# Patient Record
Sex: FEMALE | Race: WHITE | Employment: UNEMPLOYED | URBAN - METROPOLITAN AREA
[De-identification: names, ages, dates, MRNs, and addresses within clinical notes are randomized per-mention and may not be internally consistent; named-entity substitution may affect disease eponyms.]

---

## 2020-08-20 ENCOUNTER — HOSPITAL ENCOUNTER (INPATIENT)
Facility: CLINIC | Age: 50
LOS: 3 days | Discharge: HOME | End: 2020-08-23
Attending: PHYSICIAN ASSISTANT | Admitting: INTERNAL MEDICINE
Payer: COMMERCIAL

## 2020-08-20 ENCOUNTER — APPOINTMENT (OUTPATIENT)
Dept: CT IMAGING | Facility: CLINIC | Age: 50
End: 2020-08-20
Attending: PHYSICIAN ASSISTANT
Payer: COMMERCIAL

## 2020-08-20 DIAGNOSIS — K57.32 DIVERTICULITIS OF COLON: ICD-10-CM

## 2020-08-20 DIAGNOSIS — K57.20 DIVERTICULITIS OF LARGE INTESTINE WITH PERFORATION WITHOUT BLEEDING: Primary | ICD-10-CM

## 2020-08-20 DIAGNOSIS — K63.1: ICD-10-CM

## 2020-08-20 LAB
ALBUMIN UR-MCNC: 50 MG/DL
ANION GAP SERPL CALCULATED.3IONS-SCNC: 4 MMOL/L (ref 3–14)
APPEARANCE UR: ABNORMAL
BACTERIA #/AREA URNS HPF: ABNORMAL /HPF
BASOPHILS # BLD AUTO: 0.1 10E9/L (ref 0–0.2)
BASOPHILS NFR BLD AUTO: 0.4 %
BILIRUB UR QL STRIP: ABNORMAL
BUN SERPL-MCNC: 13 MG/DL (ref 7–30)
CALCIUM SERPL-MCNC: 9 MG/DL (ref 8.5–10.1)
CHLORIDE SERPL-SCNC: 103 MMOL/L (ref 94–109)
CO2 SERPL-SCNC: 27 MMOL/L (ref 20–32)
COLOR UR AUTO: ABNORMAL
CREAT SERPL-MCNC: 0.92 MG/DL (ref 0.52–1.04)
DIFFERENTIAL METHOD BLD: ABNORMAL
EOSINOPHIL # BLD AUTO: 0 10E9/L (ref 0–0.7)
EOSINOPHIL NFR BLD AUTO: 0.2 %
ERYTHROCYTE [DISTWIDTH] IN BLOOD BY AUTOMATED COUNT: 14.6 % (ref 10–15)
GFR SERPL CREATININE-BSD FRML MDRD: 74 ML/MIN/{1.73_M2}
GLUCOSE SERPL-MCNC: 108 MG/DL (ref 70–99)
GLUCOSE UR STRIP-MCNC: NEGATIVE MG/DL
HCT VFR BLD AUTO: 53.9 % (ref 35–47)
HGB BLD-MCNC: 16.7 G/DL (ref 11.7–15.7)
HGB UR QL STRIP: ABNORMAL
IMM GRANULOCYTES # BLD: 0.1 10E9/L (ref 0–0.4)
IMM GRANULOCYTES NFR BLD: 0.4 %
KETONES UR STRIP-MCNC: ABNORMAL MG/DL
LACTATE BLD-SCNC: 0.5 MMOL/L (ref 0.7–2)
LEUKOCYTE ESTERASE UR QL STRIP: ABNORMAL
LYMPHOCYTES # BLD AUTO: 2 10E9/L (ref 0.8–5.3)
LYMPHOCYTES NFR BLD AUTO: 11.9 %
MCH RBC QN AUTO: 30 PG (ref 26.5–33)
MCHC RBC AUTO-ENTMCNC: 31 G/DL (ref 31.5–36.5)
MCV RBC AUTO: 97 FL (ref 78–100)
MONOCYTES # BLD AUTO: 1.2 10E9/L (ref 0–1.3)
MONOCYTES NFR BLD AUTO: 7.1 %
MUCOUS THREADS #/AREA URNS LPF: PRESENT /LPF
NEUTROPHILS # BLD AUTO: 13.6 10E9/L (ref 1.6–8.3)
NEUTROPHILS NFR BLD AUTO: 80 %
NITRATE UR QL: NEGATIVE
NRBC # BLD AUTO: 0 10*3/UL
NRBC BLD AUTO-RTO: 0 /100
PH UR STRIP: 5.5 PH (ref 5–7)
PLATELET # BLD AUTO: 192 10E9/L (ref 150–450)
POTASSIUM SERPL-SCNC: 4.4 MMOL/L (ref 3.4–5.3)
RBC # BLD AUTO: 5.56 10E12/L (ref 3.8–5.2)
RBC #/AREA URNS AUTO: 5 /HPF (ref 0–2)
SODIUM SERPL-SCNC: 134 MMOL/L (ref 133–144)
SOURCE: ABNORMAL
SP GR UR STRIP: 1.03 (ref 1–1.03)
SQUAMOUS #/AREA URNS AUTO: 22 /HPF (ref 0–1)
UROBILINOGEN UR STRIP-MCNC: 3 MG/DL (ref 0–2)
WBC # BLD AUTO: 17 10E9/L (ref 4–11)
WBC #/AREA URNS AUTO: 21 /HPF (ref 0–5)

## 2020-08-20 PROCEDURE — 25800030 ZZH RX IP 258 OP 636: Performed by: PHYSICIAN ASSISTANT

## 2020-08-20 PROCEDURE — 25000128 H RX IP 250 OP 636: Performed by: PHYSICIAN ASSISTANT

## 2020-08-20 PROCEDURE — 83605 ASSAY OF LACTIC ACID: CPT | Performed by: PHYSICIAN ASSISTANT

## 2020-08-20 PROCEDURE — 25000125 ZZHC RX 250: Performed by: PHYSICIAN ASSISTANT

## 2020-08-20 PROCEDURE — 99285 EMERGENCY DEPT VISIT HI MDM: CPT | Mod: 25

## 2020-08-20 PROCEDURE — 80048 BASIC METABOLIC PNL TOTAL CA: CPT | Performed by: PHYSICIAN ASSISTANT

## 2020-08-20 PROCEDURE — 99207 ZZC APP CREDIT; MD BILLING SHARED VISIT: CPT | Performed by: PHYSICIAN ASSISTANT

## 2020-08-20 PROCEDURE — 85025 COMPLETE CBC W/AUTO DIFF WBC: CPT | Performed by: PHYSICIAN ASSISTANT

## 2020-08-20 PROCEDURE — 12000000 ZZH R&B MED SURG/OB

## 2020-08-20 PROCEDURE — 87040 BLOOD CULTURE FOR BACTERIA: CPT | Performed by: PHYSICIAN ASSISTANT

## 2020-08-20 PROCEDURE — U0003 INFECTIOUS AGENT DETECTION BY NUCLEIC ACID (DNA OR RNA); SEVERE ACUTE RESPIRATORY SYNDROME CORONAVIRUS 2 (SARS-COV-2) (CORONAVIRUS DISEASE [COVID-19]), AMPLIFIED PROBE TECHNIQUE, MAKING USE OF HIGH THROUGHPUT TECHNOLOGIES AS DESCRIBED BY CMS-2020-01-R: HCPCS | Performed by: PHYSICIAN ASSISTANT

## 2020-08-20 PROCEDURE — 99223 1ST HOSP IP/OBS HIGH 75: CPT | Mod: AI | Performed by: INTERNAL MEDICINE

## 2020-08-20 PROCEDURE — 25000132 ZZH RX MED GY IP 250 OP 250 PS 637: Performed by: PHYSICIAN ASSISTANT

## 2020-08-20 PROCEDURE — 96361 HYDRATE IV INFUSION ADD-ON: CPT

## 2020-08-20 PROCEDURE — C9803 HOPD COVID-19 SPEC COLLECT: HCPCS

## 2020-08-20 PROCEDURE — 99222 1ST HOSP IP/OBS MODERATE 55: CPT | Performed by: SURGERY

## 2020-08-20 PROCEDURE — 96365 THER/PROPH/DIAG IV INF INIT: CPT | Mod: 59

## 2020-08-20 PROCEDURE — 96375 TX/PRO/DX INJ NEW DRUG ADDON: CPT

## 2020-08-20 PROCEDURE — 87086 URINE CULTURE/COLONY COUNT: CPT | Performed by: PHYSICIAN ASSISTANT

## 2020-08-20 PROCEDURE — 81001 URINALYSIS AUTO W/SCOPE: CPT | Performed by: PHYSICIAN ASSISTANT

## 2020-08-20 PROCEDURE — 74177 CT ABD & PELVIS W/CONTRAST: CPT

## 2020-08-20 RX ORDER — VARENICLINE TARTRATE 1 MG/1
1 TABLET, FILM COATED ORAL 2 TIMES DAILY
COMMUNITY

## 2020-08-20 RX ORDER — ACETAMINOPHEN 325 MG/1
650 TABLET ORAL EVERY 4 HOURS PRN
Status: DISCONTINUED | OUTPATIENT
Start: 2020-08-20 | End: 2020-08-23 | Stop reason: HOSPADM

## 2020-08-20 RX ORDER — ONDANSETRON 4 MG/1
4 TABLET, ORALLY DISINTEGRATING ORAL EVERY 6 HOURS PRN
Status: DISCONTINUED | OUTPATIENT
Start: 2020-08-20 | End: 2020-08-23 | Stop reason: HOSPADM

## 2020-08-20 RX ORDER — SODIUM CHLORIDE 9 MG/ML
INJECTION, SOLUTION INTRAVENOUS CONTINUOUS
Status: DISCONTINUED | OUTPATIENT
Start: 2020-08-20 | End: 2020-08-21

## 2020-08-20 RX ORDER — KETOROLAC TROMETHAMINE 15 MG/ML
15 INJECTION, SOLUTION INTRAMUSCULAR; INTRAVENOUS ONCE
Status: COMPLETED | OUTPATIENT
Start: 2020-08-20 | End: 2020-08-20

## 2020-08-20 RX ORDER — IBUPROFEN 600 MG/1
600 TABLET, FILM COATED ORAL EVERY 6 HOURS PRN
Status: DISCONTINUED | OUTPATIENT
Start: 2020-08-20 | End: 2020-08-23 | Stop reason: HOSPADM

## 2020-08-20 RX ORDER — SODIUM CHLORIDE 9 MG/ML
INJECTION, SOLUTION INTRAVENOUS CONTINUOUS
Status: DISCONTINUED | OUTPATIENT
Start: 2020-08-20 | End: 2020-08-20

## 2020-08-20 RX ORDER — LEVOTHYROXINE SODIUM 125 UG/1
125 TABLET ORAL DAILY
Status: DISCONTINUED | OUTPATIENT
Start: 2020-08-21 | End: 2020-08-23 | Stop reason: HOSPADM

## 2020-08-20 RX ORDER — OXYCODONE HYDROCHLORIDE 5 MG/1
5-10 TABLET ORAL
Status: DISCONTINUED | OUTPATIENT
Start: 2020-08-20 | End: 2020-08-23 | Stop reason: HOSPADM

## 2020-08-20 RX ORDER — ONDANSETRON 4 MG/1
4 TABLET, FILM COATED ORAL 3 TIMES DAILY PRN
COMMUNITY
Start: 2020-07-16

## 2020-08-20 RX ORDER — HYDROMORPHONE HYDROCHLORIDE 1 MG/ML
.3-.5 INJECTION, SOLUTION INTRAMUSCULAR; INTRAVENOUS; SUBCUTANEOUS
Status: DISCONTINUED | OUTPATIENT
Start: 2020-08-20 | End: 2020-08-23 | Stop reason: HOSPADM

## 2020-08-20 RX ORDER — LEVOTHYROXINE SODIUM 125 UG/1
125 TABLET ORAL DAILY
COMMUNITY
Start: 2020-07-16

## 2020-08-20 RX ORDER — BUDESONIDE 0.5 MG/2ML
0.5 INHALANT ORAL 2 TIMES DAILY PRN
COMMUNITY
Start: 2019-10-21

## 2020-08-20 RX ORDER — ALBUTEROL SULFATE 90 UG/1
2 AEROSOL, METERED RESPIRATORY (INHALATION) EVERY 4 HOURS PRN
COMMUNITY
Start: 2020-03-20

## 2020-08-20 RX ORDER — NICOTINE 21 MG/24HR
1 PATCH, TRANSDERMAL 24 HOURS TRANSDERMAL DAILY PRN
Status: DISCONTINUED | OUTPATIENT
Start: 2020-08-20 | End: 2020-08-23 | Stop reason: HOSPADM

## 2020-08-20 RX ORDER — METOCLOPRAMIDE HYDROCHLORIDE 5 MG/ML
5 INJECTION INTRAMUSCULAR; INTRAVENOUS ONCE
Status: COMPLETED | OUTPATIENT
Start: 2020-08-20 | End: 2020-08-20

## 2020-08-20 RX ORDER — TRAZODONE HYDROCHLORIDE 150 MG/1
150 TABLET ORAL
COMMUNITY
Start: 2020-03-27

## 2020-08-20 RX ORDER — LIDOCAINE 40 MG/G
CREAM TOPICAL
Status: DISCONTINUED | OUTPATIENT
Start: 2020-08-20 | End: 2020-08-23 | Stop reason: HOSPADM

## 2020-08-20 RX ORDER — NALOXONE HYDROCHLORIDE 0.4 MG/ML
.1-.4 INJECTION, SOLUTION INTRAMUSCULAR; INTRAVENOUS; SUBCUTANEOUS
Status: DISCONTINUED | OUTPATIENT
Start: 2020-08-20 | End: 2020-08-23 | Stop reason: HOSPADM

## 2020-08-20 RX ORDER — ONDANSETRON 2 MG/ML
4 INJECTION INTRAMUSCULAR; INTRAVENOUS EVERY 6 HOURS PRN
Status: DISCONTINUED | OUTPATIENT
Start: 2020-08-20 | End: 2020-08-23 | Stop reason: HOSPADM

## 2020-08-20 RX ORDER — IOPAMIDOL 755 MG/ML
500 INJECTION, SOLUTION INTRAVASCULAR ONCE
Status: COMPLETED | OUTPATIENT
Start: 2020-08-20 | End: 2020-08-20

## 2020-08-20 RX ADMIN — OXYCODONE HYDROCHLORIDE 5 MG: 5 TABLET ORAL at 17:46

## 2020-08-20 RX ADMIN — METOCLOPRAMIDE HYDROCHLORIDE 5 MG: 5 INJECTION INTRAMUSCULAR; INTRAVENOUS at 11:14

## 2020-08-20 RX ADMIN — SODIUM CHLORIDE: 9 INJECTION, SOLUTION INTRAVENOUS at 16:16

## 2020-08-20 RX ADMIN — TAZOBACTAM SODIUM AND PIPERACILLIN SODIUM 3.38 G: 375; 3 INJECTION, SOLUTION INTRAVENOUS at 13:40

## 2020-08-20 RX ADMIN — TAZOBACTAM SODIUM AND PIPERACILLIN SODIUM 4.5 G: 500; 4 INJECTION, SOLUTION INTRAVENOUS at 20:52

## 2020-08-20 RX ADMIN — SODIUM CHLORIDE 65 ML: 9 INJECTION, SOLUTION INTRAVENOUS at 12:43

## 2020-08-20 RX ADMIN — IOPAMIDOL 100 ML: 755 INJECTION, SOLUTION INTRAVENOUS at 12:42

## 2020-08-20 RX ADMIN — ACETAMINOPHEN 650 MG: 325 TABLET, FILM COATED ORAL at 23:49

## 2020-08-20 RX ADMIN — SODIUM CHLORIDE 1000 ML: 9 INJECTION, SOLUTION INTRAVENOUS at 13:41

## 2020-08-20 RX ADMIN — OXYCODONE HYDROCHLORIDE 10 MG: 5 TABLET ORAL at 23:49

## 2020-08-20 RX ADMIN — SODIUM CHLORIDE 1000 ML: 9 INJECTION, SOLUTION INTRAVENOUS at 11:17

## 2020-08-20 RX ADMIN — KETOROLAC TROMETHAMINE 15 MG: 15 INJECTION, SOLUTION INTRAMUSCULAR; INTRAVENOUS at 11:14

## 2020-08-20 ASSESSMENT — ACTIVITIES OF DAILY LIVING (ADL)
ADLS_ACUITY_SCORE: 14
ADLS_ACUITY_SCORE: 12

## 2020-08-20 ASSESSMENT — ENCOUNTER SYMPTOMS
DYSURIA: 0
CHEST TIGHTNESS: 1
FREQUENCY: 0
FLANK PAIN: 0
COUGH: 1
VOMITING: 0
ROS GI COMMENTS: RECTAL DISCHARGE
ABDOMINAL PAIN: 1
SHORTNESS OF BREATH: 1
FEVER: 0
DIARRHEA: 0

## 2020-08-20 NOTE — ED NOTES
Assumed care of patient, BP cuff and monitoring devices applied. Pt educated on need to transfer to our respiratory symptom unit due to dropping oxygen saturations. Pt verbalized understanding but reports that this is normal for her and her COPD dx. Pt in no apparent distress. Call light in reach, side rails up x2.

## 2020-08-20 NOTE — H&P
History and Physical     Josee Fairbanks MRN# [id number]   YOB: 1970 Age: 49 year old      Date of Admission:  8/20/2020    Primary care provider: Garret Rivera          Assessment and Plan:   Josee Fairbanks is a 49 year old female with a PMH significant for alcohol abuse, now in remission, asthma, GERD and hypothyroidism, who presents with lower abdominal pain and nausea.     1. Acute sigmoid diverticulitis with perforation - 4 days of abdominal pain with nausea, initially thought it was constipation so took Mag citrate with subsequent diarrhea, some blood. Denies hx of diverticulitis, denies fevers, notes nausea, denies vomiting. Similar pain about 6 months ago but related this to alcohol use, quit drinking then and went through withdrawal. No urgent need for surgery but will consult gen surg. Continue IVFs, pain control and supportive cares. Will continue IV Zosyn and keep NPO today to rest the bowel.   2. GERD - resume PPI but will make it IV for now  3. Asthma - resume home inhalers if available  4. Hypothyroidism - resume home meds  5. Hx of alcohol abuse - stopped drinking ~6 months ago, was hospitalized and went through withdrawal. No evidence of withdrawal here    Prophylaxis - mechanical  Code status - Full Code  Dispo - admit to inpatient. Anticipate at least 2 nights in the hospital                Chief Complaint:   Abdominal pain         History of Present Illness:   Josee Fairbanks is a 49 year old female with a PMH significant for alcohol abuse, now in remission, asthma, GERD and hypothyroidism, who presents with lower abdominal pain and nausea.  Patient reports 4 days of lower abdominal pain with associated nausea.  She initially thought that she was constipated so took mag citrate and subsequently developed diarrhea.  She does report some bloatedness stool.  She endorses feeling hot and cold related to her pain but denies having measured fevers.  She also denies  chest pain, shortness of breath, cough, vomiting or dysuria.  She denies a history of diverticulitis but has had a colonoscopy.  Patient reports similar pain about 6 months ago but this was in the setting of heavy alcohol use and was admitted to the hospital for alcohol withdrawal at that time. She is a smoker, ~1 pack per day.   In the ED, vital signs were initially stable.  She did develop mild hypoxia, SPO2 88% on room air, this improved to 96% on 2 L via nasal cannula.  BMP is unremarkable.  CBC is significant for a elevated WBC at 17, hemoglobin 16.7, platelets are normal at 192.  Lactic acid is within normal limits at 0.5.  UA is abnormal with large leukocyte esterase and 21 white blood cells and trace blood.  Blood cultures are drawn and pending.  CT of the abdomen pelvis was obtained which shows a long segment of diverticulitis in the sigmoid colon with localized contained perforation and no evidence of abscess.  She was given 1 L normal saline bolus x2, 50 mg IV Toradol, 5 mg IV Reglan, and started on IV Zosyn.  She will be admitted for further management of acute diverticulitis with perforation.  General surgery was made aware of the patient in the ED, no indication for emergent surgical intervention at this time.    Hx obtained by speaking with ED physician, chart review and pt interview.               Past Medical History:   Alcohol abuse, in remission  GERD  Asthma  Hypothyroidism          Past Surgical History:   Cholecystectomy          Social History:     Social History     Socioeconomic History     Marital status:      Spouse name: Not on file     Number of children: Not on file     Years of education: Not on file     Highest education level: Not on file   Occupational History     Not on file   Social Needs     Financial resource strain: Not on file     Food insecurity     Worry: Not on file     Inability: Not on file     Transportation needs     Medical: Not on file     Non-medical: Not on  file   Tobacco Use     Smoking status: Not on file   Substance and Sexual Activity     Alcohol use: Not on file     Drug use: Not on file     Sexual activity: Not on file   Lifestyle     Physical activity     Days per week: Not on file     Minutes per session: Not on file     Stress: Not on file   Relationships     Social connections     Talks on phone: Not on file     Gets together: Not on file     Attends Baptism service: Not on file     Active member of club or organization: Not on file     Attends meetings of clubs or organizations: Not on file     Relationship status: Not on file     Intimate partner violence     Fear of current or ex partner: Not on file     Emotionally abused: Not on file     Physically abused: Not on file     Forced sexual activity: Not on file   Other Topics Concern     Not on file   Social History Narrative     Not on file   Current smoker, 1 pack/day.  History of alcohol abuse, currently in remission.            Family History:   Reviewed and noncontributory         Allergies:      Allergies   Allergen Reactions     Prednisone Other (See Comments)     Shaky, insomnia, urinary frequency, stomach upset     Topiramate Other (See Comments)     forgetful               Medications:     Prior to Admission medications    Medication Sig Last Dose Taking? Auth Provider   budesonide (PULMICORT) 0.5 MG/2ML neb solution Take 0.5 mg by nebulization 2 times daily  Yes Unknown, Entered By History   Fluticasone-Umeclidin-Vilanterol (TRELEGY ELLIPTA) 100-62.5-25 MCG/INH oral inhaler Inhale 1 puff into the lungs daily  Yes Unknown, Entered By History   levothyroxine (SYNTHROID/LEVOTHROID) 125 MCG tablet Take 125 mcg by mouth daily  Yes Unknown, Entered By History   ondansetron (ZOFRAN) 4 MG tablet Take 4 mg by mouth 3 times daily as needed  Yes Unknown, Entered By History   PROAIR  (90 Base) MCG/ACT inhaler Inhale 2 puffs into the lungs every 4 hours as needed for shortness of breath / dyspnea  Yes  Unknown, Entered By History   sertraline (ZOLOFT) 50 MG tablet Take 50 mg by mouth daily  Yes Unknown, Entered By History   traZODone (DESYREL) 150 MG tablet Take 150 mg by mouth nightly as needed  Yes Unknown, Entered By History   omeprazole (PRILOSEC) 20 MG DR capsule Take 20 mg by mouth daily as needed   Unknown, Entered By History              Review of Systems:   A Comprehensive greater than 10 system review of systems was carried out.  Pertinent positives and negatives are noted above.  Otherwise negative for contributory information.     Review Of Systems  Skin: negative  Eyes: negative  Ears/Nose/Throat: negative  Respiratory: No shortness of breath, dyspnea on exertion, cough, or hemoptysis  Cardiovascular: negative  Gastrointestinal: negative  Genitourinary: negative  Musculoskeletal: negative  Neurologic: negative  Psychiatric: negative  Hematologic/Lymphatic/Immunologic: negative  Endocrine: negative             Physical Exam:   Blood pressure 118/80, pulse 79, temperature 96.6  F (35.9  C), resp. rate 18, SpO2 97 %.  Exam:  GENERAL:  Comfortable.  PSYCH: pleasant, oriented, No acute distress.  HEENT:  Atraumatic, normocephalic. Normal conjunctiva, normal hearing, and oropharynx is normal.  NECK:  Supple, no neck vein distention  HEART:  Normal S1, S2 with no murmur, no pericardial rub, gallops or S3 or S4.  LUNGS:  Clear to auscultation, normal Respiratory effort. No wheezing, rales or ronchi.  GI:  Soft, normal bowel sounds. Lower abdominal tenderness, non distended.   EXTREMITIES:  No pedal edema, +2 pulses bilateral and equal.  SKIN:  Dry to touch, No rash, wound or ulcerations.  NEUROLOGIC:  CN 2-12 intact, BL 5/5 symmetric upper and lower extremity strength, sensation is intact with no focal deficits.               Data:     Recent Labs   Lab 08/20/20  1056   WBC 17.0*   HGB 16.7*   HCT 53.9*   MCV 97        Recent Labs   Lab 08/20/20  1056      POTASSIUM 4.4   CHLORIDE 103   CO2 27    ANIONGAP 4   *   BUN 13   CR 0.92   GFRESTIMATED 74   GFRESTBLACK 85   JACKIE 9.0     Lactic acid - 0.5    Recent Labs   Lab 08/20/20  1117   COLOR [place]   APPEARANCE Slightly Cloudy   URINEGLC Negative   URINEBILI Small*   URINEKETONE Trace*   SG 1.034   UBLD Trace*   URINEPH 5.5   PROTEIN 50*   NITRITE Negative   LEUKEST Large*   RBCU 5*   WBCU 21*       Recent Results (from the past 48 hour(s))   CT Abdomen Pelvis w Contrast    Narrative    CT ABDOMEN AND PELVIS WITH CONTRAST 8/20/2020 12:53 PM    HISTORY: Abdominal pain and bloody stool. Suspect diverticulitis.    TECHNIQUE: Helical axial scans from dome of liver through pubic  symphysis with 100 mL Isovue-370 IV contrast. Radiation dose for this  scan was reduced using automated exposure control, adjustment of the  mA and/or kV according to patient size, or iterative reconstruction  technique.    COMPARISON: None.    FINDINGS: Scattered platelike atelectasis or linear scarring in the  posterior lung bases bilaterally. Status post cholecystectomy. The  liver, spleen, pancreas, bilateral adrenal glands and right kidney are  unremarkable. There is a 1 cm benign cyst in the mid posterior left  renal cortex, and the left kidney is otherwise normal. Minimal  vascular calcifications. The bowel and mesentery in the upper abdomen  appear normal.    Scans through the pelvis show extensive thickening of the wall and  surrounding inflammatory induration related to a 9 cm length of  sigmoid colon. There is inflammatory thickening of the sigmoid  mesentery. Scattered diverticula are seen, and the most likely  diagnosis is diverticulitis. There is no evidence for abscess, but  there is a small amount of extraluminal gas in the mesentery just  superior to the sigmoid colon consistent with perforation (images 71  through 76 of series 5 and images 65 through 73 of series 4). There is  no free fluid. The appendix is identified, and there is no evidence  for appendicitis,  although a 5 mm appendicolith could be present in  the proximal appendix (image 62 of series 2). A few surgical clips are  seen in the left pelvis adjacent to the external iliac vessels (image  68 of series 2). Prior hysterectomy.      Impression    IMPRESSION:  1. Long segment of diverticulitis in the sigmoid colon with localized  contained perforation but no abscess.  2. Possible appendicolith.  3. Small benign cyst left kidney. Minimal vascular calcifications.         Iris Stratton PA-C    This patient was seen and discussed with Dr. Roper who agrees with the current plans as outlined above.

## 2020-08-20 NOTE — ED PROVIDER NOTES
History     Chief Complaint:  Abdominal Pain     HPI:  Josee Fairbanks is a 49 year old female with a history of GERD, chronic gastritis, and alcoholism who presents with abdominal pain. Patient has had constant, 6-10/10, aching and shooting low abdominal pain for the last 4 days. She also reports mucous rectal discharge when trying to pass BMs, although she has not been able to pass a BM for several days. Patient does mention some bloody discharge yesterday, although this has resolved as of today. She states that she has tried citrate and magnesium to relieve her symptoms. She denies any vomiting, diarrhea, fever, or new urinary symptoms besides her baseline. Patient mentions that she was having similar pain about 6 months ago, but thought this was due to her drinking which she has reportedly stopped.    Allergies:  Prednisone  Topiramate     Medications:    Pulmicort inhaler  Ellipta inhaler  Zoloft  Phenergan  prilosec  Albuterol inhaler  Desyrel  Chantix  Vitamin D  Miacalcin spray  Zofran  Levothyroxiine    Past Medical History:    Alcoholism  COPD  Right lower lobe pneumonia  GERD  Chronic gastritis without bleeding  Hypothyroidism  Palpitations  MAGALYS  Hypercholesteremia  Anxiety  Insomnia   Epilepsy     Past Surgical History:    Cholecystectomy  Ligation transection fallopian tube  Upper GI endoscopy  Hysterectomy  Cystourethroscopy  Colonoscopy  Colon polyp procedure  Carpal tunnel release  Debridement, left wrist      Family History:    Lung cancer  Diabetes    Social History:  Smoking status: yes  Alcohol use: no  Patient presents alone.  PCP: Garret Rivera    Marital Status:       Review of Systems   Constitutional: Negative for fever.   Respiratory: Positive for cough (chronic), chest tightness (chronic) and shortness of breath (chronic).    Gastrointestinal: Positive for abdominal pain. Negative for diarrhea and vomiting.        Rectal discharge   Genitourinary: Negative for dysuria,  flank pain, frequency and urgency.   All other systems reviewed and are negative.      Physical Exam     Vitals:  Patient Vitals for the past 24 hrs:   BP Temp Pulse Resp SpO2   08/20/20 1300 107/71 -- 83 -- 90 %   08/20/20 1258 106/69 -- 84 -- 93 %   08/20/20 1230 126/74 -- 87 -- 95 %   08/20/20 1215 120/74 -- 84 -- 95 %   08/20/20 1200 (!) 137/91 -- 83 -- 94 %   08/20/20 1152 -- -- -- -- 94 %   08/20/20 1151 130/78 -- 88 -- (!) 87 %   08/20/20 1130 116/70 -- 93 -- 91 %   08/20/20 1125 113/72 -- -- -- --   08/20/20 0950 (!) 135/90 96.6  F (35.9  C) 104 18 91 %       Physical Exam  Vitals signs and nursing note reviewed.   Constitutional:       General: She is not in acute distress.     Appearance: She is not diaphoretic.   HENT:      Head: Atraumatic.      Mouth/Throat:      Pharynx: No oropharyngeal exudate.   Eyes:      General: No scleral icterus.     Pupils: Pupils are equal, round, and reactive to light.   Cardiovascular:      Rate and Rhythm: Normal rate and regular rhythm.   Pulmonary:      Effort: Pulmonary effort is normal. No respiratory distress.   Abdominal:      General: Bowel sounds are normal.      Palpations: Abdomen is soft.      Tenderness: There is abdominal tenderness in the suprapubic area and left lower quadrant.   Musculoskeletal:         General: No tenderness.   Skin:     General: Skin is warm.      Findings: No rash.   Neurological:      Mental Status: She is alert.     :      Emergency Department Course     Imaging:  CT Abdomen/pelvis w contrast  1. Long segment of diverticulitis in the sigmoid colon with localized  contained perforation but no abscess.  2. Possible appendicolith.  3. Small benign cyst left kidney. Minimal vascular calcifications  Per radiology.    Laboratory:  CBC: WBC 17.0 (H), HGB 16.7 (H),     BMP: Glucose 108 (H) o/w WNL (Creatinine 0.92)    Urine analysis: Bilin small (A), Ketone trace (A), Blood trace (A), Albumin 50 (A), Urobilinogen 3.0 (H), Leukocyte  esterase large (A), RBC/HPF 5 (H), WBC/HPF 21 (H), Bacteria few (A), Squamous epithelial/HPF 22 (H), Mucous present (A) o/w WNL    Lactic acid (Resulted 1336): 0.5 (L)    Urine culture: pending  Blood culture x2: pending    Interventions:  1114 Toradol, 15 mg, IV  1114 Reglan, 5 mg, IV  1117 0.9% NS bolus, 1000 mL, IV    Emergency Department Course:  1041  Past medical records, nursing notes, and vitals reviewed.    1044  I performed an exam of the patient and obtained history, as documented above.    1056 IV was inserted and blood was drawn for laboratory testing, results above.    1117 The patient provided a urine sample here in the emergency department. This was sent for laboratory testing, findings above.    1238 The patient was sent for an abdominal/pelvic CT while in the emergency department, results above.     1306 Findings and plan explained to the Patient who consents to admission.    1324 Discussed the patient with DONNELL Fontaine, for Dr. Roper who will admit the patient to a Med/Surg bed for further monitoring, evaluation, and treatment.    I personally reviewed the laboratory & imaging results with the Patient and answered all related questions prior to admission.     Impression & Plan     Medical Decision Making:  Josee Fairbanks is a 49 year old female who presents to the emergency department today for evaluation of lower abdominal pain, stool changes. Clinically she voices symptoms to raise consideration for colitis, diverticulitis, or proctitis. Her diagnostic labs and imaging ultimately demonstrate diverticulitis with local perforation. She has no peritoneal signs on examination at this time. Antibiotics, analgesia, parenteral volume resuscitation, admission for further care. Hospitalist admission with surgical consultation    Diagnosis:    ICD-10-CM    1. Diverticulitis of colon  K57.32 Lactic acid whole blood     Blood culture     Blood culture   2. Perforated intestine (H)  K63.1          Disposition:  Admitted to Dr. Roper.     Scribe Disclosure:  I, Sandra Chan, am serving as a scribe at 10:44 AM on 8/20/2020 to document services personally performed by Jose Stout based on my observations and the provider's statements to me.       Sandra Chan  8/20/2020     Jose Stout PA-C  08/20/20 3351

## 2020-08-20 NOTE — PHARMACY-ADMISSION MEDICATION HISTORY
Admission medication history interview status for this patient is complete. See [facility] admission navigator for allergy information, prior to admission medications and immunization status.     Medication history interview done via telephone during Covid-19 pandemic, indicate source(s): Patient  Medication history resources (including written lists, pill bottles, clinic record):None  Pharmacy: [facility] in [place], [place]  Changes made to PTA medication list:  Added: Chantix  Deleted: None  Changed: Budesonide BID --> BID PRN    Actions taken by pharmacist (provider contacted, etc):None     Additional medication history information:None    Medication reconciliation/reorder completed by provider prior to medication history?  Yes   (Y/N)     For patients on insulin therapy: No  (Y/N)      Prior to Admission medications    Medication Sig Last Dose Taking? Auth Provider   Fluticasone-Umeclidin-Vilanterol (TRELEGY ELLIPTA) 100-62.5-25 MCG/INH oral inhaler Inhale 1 puff into the lungs daily Past Week at Unknown time Yes Unknown, Entered By History   levothyroxine (SYNTHROID/LEVOTHROID) 125 MCG tablet Take 125 mcg by mouth daily Past Week at Unknown time Yes Unknown, Entered By History   omeprazole (PRILOSEC) 20 MG DR capsule Take 20 mg by mouth daily as needed Past Week at Unknown time Yes Unknown, Entered By History   ondansetron (ZOFRAN) 4 MG tablet Take 4 mg by mouth 3 times daily as needed Past Week at Unknown time Yes Unknown, Entered By History   PROAIR  (90 Base) MCG/ACT inhaler Inhale 2 puffs into the lungs every 4 hours as needed for shortness of breath / dyspnea 8/19/2020 at Unknown time Yes Unknown, Entered By History   sertraline (ZOLOFT) 50 MG tablet Take 50 mg by mouth daily Past Week at Unknown time Yes Unknown, Entered By History   traZODone (DESYREL) 150 MG tablet Take 150 mg by mouth nightly as needed Past Week at Unknown time Yes Unknown, Entered By History   varenicline (CHANTIX) 1 MG tablet  Take 1 mg by mouth 2 times daily Past Week at Unknown time Yes Unknown, Entered By History   budesonide (PULMICORT) 0.5 MG/2ML neb solution Take 0.5 mg by nebulization 2 times daily as needed  More than a month at Unknown time  Unknown, Entered By History

## 2020-08-20 NOTE — ED NOTES
"[facility]  ED Nurse Handoff Report    Josee Fairbanks is a 49 year old female   ED Chief complaint: Abdominal Pain  . ED Diagnosis:   Final diagnoses:   Diverticulitis of colon   Perforated intestine (H)     Allergies:   Allergies   Allergen Reactions     Prednisone Other (See Comments)     Shaky, insomnia, urinary frequency, stomach upset     Topiramate Other (See Comments)     forgetful       Code Status: Full Code  Activity level - Baseline/Home:  Independent. Activity Level - Current:   Independent. Lift room needed: No. Bariatric: No   Needed: No   Isolation: No. Infection: Not Applicable.     Vital Signs:   Vitals:    08/20/20 1300 08/20/20 1311 08/20/20 1315 08/20/20 1330   BP: 107/71 114/75 114/75 122/71   Pulse: 83  84 82   Resp:       Temp:       SpO2: 90% 91% 90% 94%       Cardiac Rhythm:  ,      Pain level:    Patient confused: No. Patient Falls Risk: Yes.   Elimination Status: Has voided   Patient Report - Initial Complaint: Patient presents with complaints of lower abdomen pain for the past four days. Denies any vomiting or diarrhea. ABC intact without need for intervention at this time. .   Focused Assessment:    Gastrointestinal Gastrointestinal - Gastrointestinal Comment: Lower abdominal pain and tenderness for 4 days, she thought she was perhaps consitated so took mag citrate. No vomiting, but states she feels \"gassy.\"    Patient is visiting from out of town. Patient has a history of COPD and wears 2.5 liters of oxygen intermittently at baseline. Pt did not bring her oxygen with her on her trip. Patient maintaining oxygen saturations on 2L nasal cannula. Room air trial was tried in ER and patient dropped oxygen saturations to 88%.     Tests Performed: labs, imaging Abnormal Results:   Labs Ordered and Resulted from Time of ED Arrival Up to the Time of Departure from the ED   CBC WITH PLATELETS DIFFERENTIAL - Abnormal; Notable for the following components:       Result " Value    WBC 17.0 (*)     RBC Count 5.56 (*)     Hemoglobin 16.7 (*)     Hematocrit 53.9 (*)     MCHC 31.0 (*)     Absolute Neutrophil 13.6 (*)     All other components within normal limits   BASIC METABOLIC PANEL - Abnormal; Notable for the following components:    Glucose 108 (*)     All other components within normal limits   UA MACROSCOPIC WITH REFLEX TO MICRO AND CULTURE - Abnormal; Notable for the following components:    Bilirubin Urine Small (*)     Ketones Urine Trace (*)     Blood Urine Trace (*)     Protein Albumin Urine 50 (*)     Urobilinogen mg/dL 3.0 (*)     Leukocyte Esterase Urine Large (*)     RBC Urine 5 (*)     WBC Urine 21 (*)     Bacteria Urine Few (*)     Squamous Epithelial /HPF Urine 22 (*)     Mucous Urine Present (*)     All other components within normal limits   LACTIC ACID WHOLE BLOOD - Abnormal; Notable for the following components:    Lactic Acid 0.5 (*)     All other components within normal limits   COVID-19 VIRUS (CORONAVIRUS) BY PCR   URINE CULTURE AEROBIC BACTERIAL   BLOOD CULTURE   BLOOD CULTURE     CT Abdomen Pelvis w Contrast   Preliminary Result   IMPRESSION:   1. Long segment of diverticulitis in the sigmoid colon with localized   contained perforation but no abscess.   2. Possible appendicolith.   3. Small benign cyst left kidney. Minimal vascular calcifications.        .   Treatments provided: see MAR  Family Comments: none at bedside  OBS brochure/video discussed/provided to patient:  N/A  ED Medications:   Medications   0.9% sodium chloride BOLUS (0 mLs Intravenous Stopped 8/20/20 1230)     Followed by   sodium chloride 0.9% infusion (has no administration in time range)   piperacillin-tazobactam (ZOSYN) infusion 3.375 g (3.375 g Intravenous New Bag 8/20/20 1340)   0.9% sodium chloride BOLUS (1,000 mLs Intravenous New Bag 8/20/20 1341)   ketorolac (TORADOL) injection 15 mg (15 mg Intravenous Given 8/20/20 1114)   metoclopramide (REGLAN) injection 5 mg (5 mg Intravenous  Given 8/20/20 1114)   iopamidol (ISOVUE-370) solution 500 mL (100 mLs Intravenous Given 8/20/20 1242)   CT Scan Flush (65 mLs Intravenous Given 8/20/20 1243)     Drips infusing:  No  For the majority of the shift, the patient's behavior Green. Interventions performed were N/A.    Sepsis treatment initiated: No       ED Nurse Name/Phone Number: Seble Allen RN,   1:46 PM    RECEIVING UNIT ED HANDOFF REVIEW    Above ED Nurse Handoff Report was reviewed: Yes  Reviewed by: Elena Jernigan, DARIEN on August 20, 2020 at 2:29 PM    RECEIVING UNIT ED HANDOFF REVIEW    Above ED Nurse Handoff Report was reviewed: Yes  Reviewed by: Jose Alejandro Evertet RN on August 20, 2020 at 2:45 PM

## 2020-08-21 ENCOUNTER — APPOINTMENT (OUTPATIENT)
Dept: GENERAL RADIOLOGY | Facility: CLINIC | Age: 50
End: 2020-08-21
Attending: INTERNAL MEDICINE
Payer: COMMERCIAL

## 2020-08-21 LAB
ANION GAP SERPL CALCULATED.3IONS-SCNC: 3 MMOL/L (ref 3–14)
BACTERIA SPEC CULT: NORMAL
BASOPHILS # BLD AUTO: 0.1 10E9/L (ref 0–0.2)
BASOPHILS NFR BLD AUTO: 0.4 %
BUN SERPL-MCNC: 12 MG/DL (ref 7–30)
CALCIUM SERPL-MCNC: 8.1 MG/DL (ref 8.5–10.1)
CHLORIDE SERPL-SCNC: 108 MMOL/L (ref 94–109)
CO2 SERPL-SCNC: 28 MMOL/L (ref 20–32)
CREAT SERPL-MCNC: 0.8 MG/DL (ref 0.52–1.04)
DIFFERENTIAL METHOD BLD: ABNORMAL
EOSINOPHIL # BLD AUTO: 0.1 10E9/L (ref 0–0.7)
EOSINOPHIL NFR BLD AUTO: 0.8 %
ERYTHROCYTE [DISTWIDTH] IN BLOOD BY AUTOMATED COUNT: 14.6 % (ref 10–15)
GFR SERPL CREATININE-BSD FRML MDRD: 87 ML/MIN/{1.73_M2}
GLUCOSE SERPL-MCNC: 85 MG/DL (ref 70–99)
HCT VFR BLD AUTO: 48.8 % (ref 35–47)
HGB BLD-MCNC: 14.7 G/DL (ref 11.7–15.7)
IMM GRANULOCYTES # BLD: 0.1 10E9/L (ref 0–0.4)
IMM GRANULOCYTES NFR BLD: 0.5 %
LACTATE BLD-SCNC: 0.5 MMOL/L (ref 0.7–2)
LYMPHOCYTES # BLD AUTO: 1.5 10E9/L (ref 0.8–5.3)
LYMPHOCYTES NFR BLD AUTO: 12.5 %
Lab: NORMAL
MCH RBC QN AUTO: 29.9 PG (ref 26.5–33)
MCHC RBC AUTO-ENTMCNC: 30.1 G/DL (ref 31.5–36.5)
MCV RBC AUTO: 99 FL (ref 78–100)
MONOCYTES # BLD AUTO: 0.9 10E9/L (ref 0–1.3)
MONOCYTES NFR BLD AUTO: 7.6 %
NEUTROPHILS # BLD AUTO: 9.5 10E9/L (ref 1.6–8.3)
NEUTROPHILS NFR BLD AUTO: 78.2 %
NRBC # BLD AUTO: 0 10*3/UL
NRBC BLD AUTO-RTO: 0 /100
PLATELET # BLD AUTO: 150 10E9/L (ref 150–450)
POTASSIUM SERPL-SCNC: 4.1 MMOL/L (ref 3.4–5.3)
RBC # BLD AUTO: 4.91 10E12/L (ref 3.8–5.2)
SARS-COV-2 RNA SPEC QL NAA+PROBE: NOT DETECTED
SODIUM SERPL-SCNC: 139 MMOL/L (ref 133–144)
SPECIMEN SOURCE: NORMAL
SPECIMEN SOURCE: NORMAL
WBC # BLD AUTO: 12.2 10E9/L (ref 4–11)

## 2020-08-21 PROCEDURE — 99233 SBSQ HOSP IP/OBS HIGH 50: CPT | Performed by: INTERNAL MEDICINE

## 2020-08-21 PROCEDURE — 25800030 ZZH RX IP 258 OP 636: Performed by: PHYSICIAN ASSISTANT

## 2020-08-21 PROCEDURE — 36415 COLL VENOUS BLD VENIPUNCTURE: CPT | Performed by: PHYSICIAN ASSISTANT

## 2020-08-21 PROCEDURE — C9113 INJ PANTOPRAZOLE SODIUM, VIA: HCPCS | Performed by: PHYSICIAN ASSISTANT

## 2020-08-21 PROCEDURE — 25800030 ZZH RX IP 258 OP 636: Performed by: INTERNAL MEDICINE

## 2020-08-21 PROCEDURE — 25000132 ZZH RX MED GY IP 250 OP 250 PS 637: Performed by: INTERNAL MEDICINE

## 2020-08-21 PROCEDURE — 80048 BASIC METABOLIC PNL TOTAL CA: CPT | Performed by: PHYSICIAN ASSISTANT

## 2020-08-21 PROCEDURE — 83605 ASSAY OF LACTIC ACID: CPT | Performed by: INTERNAL MEDICINE

## 2020-08-21 PROCEDURE — 12000000 ZZH R&B MED SURG/OB

## 2020-08-21 PROCEDURE — 99231 SBSQ HOSP IP/OBS SF/LOW 25: CPT | Performed by: SURGERY

## 2020-08-21 PROCEDURE — 36415 COLL VENOUS BLD VENIPUNCTURE: CPT | Performed by: INTERNAL MEDICINE

## 2020-08-21 PROCEDURE — 25000132 ZZH RX MED GY IP 250 OP 250 PS 637: Performed by: PHYSICIAN ASSISTANT

## 2020-08-21 PROCEDURE — 85025 COMPLETE CBC W/AUTO DIFF WBC: CPT | Performed by: PHYSICIAN ASSISTANT

## 2020-08-21 PROCEDURE — 71045 X-RAY EXAM CHEST 1 VIEW: CPT

## 2020-08-21 PROCEDURE — 25000128 H RX IP 250 OP 636: Performed by: PHYSICIAN ASSISTANT

## 2020-08-21 RX ORDER — ALBUTEROL SULFATE 90 UG/1
2 AEROSOL, METERED RESPIRATORY (INHALATION) EVERY 4 HOURS PRN
Status: DISCONTINUED | OUTPATIENT
Start: 2020-08-21 | End: 2020-08-23 | Stop reason: HOSPADM

## 2020-08-21 RX ORDER — SODIUM CHLORIDE 9 MG/ML
INJECTION, SOLUTION INTRAVENOUS CONTINUOUS
Status: DISCONTINUED | OUTPATIENT
Start: 2020-08-21 | End: 2020-08-22

## 2020-08-21 RX ADMIN — SODIUM CHLORIDE 500 ML: 9 INJECTION, SOLUTION INTRAVENOUS at 09:41

## 2020-08-21 RX ADMIN — OXYCODONE HYDROCHLORIDE 5 MG: 5 TABLET ORAL at 16:38

## 2020-08-21 RX ADMIN — SERTRALINE HYDROCHLORIDE 50 MG: 50 TABLET ORAL at 16:38

## 2020-08-21 RX ADMIN — SODIUM CHLORIDE 20 MG: 9 INJECTION, SOLUTION INTRAVENOUS at 08:53

## 2020-08-21 RX ADMIN — LEVOTHYROXINE SODIUM 125 MCG: 125 TABLET ORAL at 08:51

## 2020-08-21 RX ADMIN — OXYCODONE HYDROCHLORIDE 5 MG: 5 TABLET ORAL at 11:48

## 2020-08-21 RX ADMIN — ACETAMINOPHEN 650 MG: 325 TABLET, FILM COATED ORAL at 18:45

## 2020-08-21 RX ADMIN — TAZOBACTAM SODIUM AND PIPERACILLIN SODIUM 4.5 G: 500; 4 INJECTION, SOLUTION INTRAVENOUS at 20:17

## 2020-08-21 RX ADMIN — SODIUM CHLORIDE: 9 INJECTION, SOLUTION INTRAVENOUS at 11:44

## 2020-08-21 RX ADMIN — SODIUM CHLORIDE: 9 INJECTION, SOLUTION INTRAVENOUS at 02:23

## 2020-08-21 RX ADMIN — ONDANSETRON 4 MG: 4 TABLET, ORALLY DISINTEGRATING ORAL at 18:43

## 2020-08-21 RX ADMIN — TAZOBACTAM SODIUM AND PIPERACILLIN SODIUM 4.5 G: 500; 4 INJECTION, SOLUTION INTRAVENOUS at 13:37

## 2020-08-21 RX ADMIN — ACETAMINOPHEN 650 MG: 325 TABLET, FILM COATED ORAL at 08:51

## 2020-08-21 RX ADMIN — TAZOBACTAM SODIUM AND PIPERACILLIN SODIUM 4.5 G: 500; 4 INJECTION, SOLUTION INTRAVENOUS at 06:17

## 2020-08-21 RX ADMIN — ONDANSETRON 4 MG: 4 TABLET, ORALLY DISINTEGRATING ORAL at 06:21

## 2020-08-21 RX ADMIN — OXYCODONE HYDROCHLORIDE 5 MG: 5 TABLET ORAL at 23:25

## 2020-08-21 ASSESSMENT — ACTIVITIES OF DAILY LIVING (ADL)
ADLS_ACUITY_SCORE: 12
ADLS_ACUITY_SCORE: 14
ADLS_ACUITY_SCORE: 12
ADLS_ACUITY_SCORE: 14

## 2020-08-21 NOTE — PLAN OF CARE
Pt A&O x4. VS: pt became hypotensive and febrile this AM. Temp of 103.1 and BP of 88/49. Triggered sepsis which came back @ 0.5. MD notified. PO tylenol and 500 ml bolus given. Chest X-ray complete. Pt has improved. Temp is now 99.0. Pt was found to be de sating into the 50's on room air this AM-pt now requiring 4L O2 on nasal cannula.  PO oxycodone managing pain. CMS intact. Up independently in room. Voiding in good amts. No BM this shift but is passing gas. Has hypoactive bowel sounds. Advanced to only sips of clear liquids-tolerating well. Continues on IV zosyn. Plan is home @ discharge. Will continue to monitor.

## 2020-08-21 NOTE — CONSULTS
Chief complaint:  Abdominal pain, left lower quadrant    HPI:  This patient is a 49 year old female visiting town from [place], where she lives, who presents with abdominal pain and nausea.  This began about 4 days ago and presented with abdominal pain and nausea.  She thought this represented constipation, and therefore took magnesium citrate.  This resulted in some diarrhea.  She does not have any known history of diverticulitis.  She did have an episode about 6 months ago of abdominal pain, which she believes was due to alcohol use.  She went through alcohol withdrawal at that time.  We are asked to see the patient today to advise on whether any urgent surgical needs are present.  CT scan in the emergency room revealed diverticulitis with contained perforation.    Past Medical History:  Alcohol abuse  GERD  Asthma  Hypothyroidism    Past Surgical History:  Cholecystectomy   Hysterectomy    Social History:  Social History     Socioeconomic History     Marital status:      Spouse name: Not on file     Number of children: Not on file     Years of education: Not on file     Highest education level: Not on file   Occupational History     Not on file   Social Needs     Financial resource strain: Not on file     Food insecurity     Worry: Not on file     Inability: Not on file     Transportation needs     Medical: Not on file     Non-medical: Not on file   Tobacco Use     Smoking status: Not on file   Substance and Sexual Activity     Alcohol use: Not on file     Drug use: Not on file     Sexual activity: Not on file   Lifestyle     Physical activity     Days per week: Not on file     Minutes per session: Not on file     Stress: Not on file   Relationships     Social connections     Talks on phone: Not on file     Gets together: Not on file     Attends [employer] service: Not on file     Active member of club or organization: Not on file     Attends meetings of clubs or organizations: Not on file      Relationship status: Not on file     Intimate partner violence     Fear of current or ex partner: Not on file     Emotionally abused: Not on file     Physically abused: Not on file     Forced sexual activity: Not on file   Other Topics Concern     Not on file   Social History Narrative     Not on file        Family History:  Family history is noncontributory    Review of Systems:  The 10 point Review of Systems is negative other than noted in the HPI and above.    Physical Exam:  General - This is a well developed, well nourished female in no apparent distress.  HEENT - Normocephalic, atraumatic.  No scleral icterus.  Neck - supple without masses  Lungs - clear to ascultation.    Heart - Regular rate & rhythm without murmur  Abdomen:   soft, non-distended with mild tenderness noted in the left lower quadrant. no masses palpated. normal bowel sounds.  Extremities - warm without edema  Neurologic - nonfocal    Relevant labs:  White blood cell count is 17,000.  Hemoglobin is 16.7.  Lactic acid is 0.5.    Imaging:  CT scan shows long segment diverticulitis in the sigmoid colon.  There are bubbles of air in the fat posterior to the colon, suggesting contained perforation.    Assessment and Plan:  This is a patient with diverticulitis with contained perforation.  This is her first known bout of diverticulitis.  Clinically, she appears quite benign at this time.  We discussed the possibility that this could convert to a more urgent situation, but at this point I would anticipate that nonsurgical treatment is appropriate.  The patient will be going back to [place] in the near future.  I explained that she should follow-up with either a general surgeon or colorectal surgeon in the next couple of weeks.  Most likely, she will need a follow-up CT scan, but that should be arranged in [place].  We will check in on her tomorrow to be sure that she is progressing appropriately.  Assuming she does well overnight, she  could probably be started on a diet tomorrow.      Dustin Nair MD  [facility]

## 2020-08-21 NOTE — PROGRESS NOTES
[facility]    General Surgery Progress Note 8/21/2020         Assessment and Plan:   Assessment:    Josee Fairbanks is a 49 year old female admitted with diverticulitis.  ABD CT findings of long segment of diverticulitis in the sigmoid colon with localized contained perforation but no abscess   - Covid pending   - WBC improved today 12.2 from 17.0   - Blood culture and urine culture pending   - afebrile overnight, now with fever of 103.1 this am - trigger sepsis protocol, lactic pending        Plan:   Pain managment: IV dilaudid, Oxycodone, Tylenol  Bowel: +gas, -BM   Diet: NPO except ice chips, sips of clears  IVFs: NS @ 100 mL/hr  Activity: encourage ambulation 3-4 x daily  Antibiotics: on Zosyn  DVT prophylaxis: ambulatioini            Interval History:   Pt resting in bed.  Was feeling good, then spiked a fever of 103 this am.  States her abdominal pain is improved.  Not hungry but is thirsty.  Passing gas, no bowel movement, has not had BM for several days.  Had tried mag citrate prior to coming to hospital         Physical Exam:   Temp: 103.1  F (39.5  C) Temp src: Oral BP: 107/55 Pulse: 109   Resp: 18   SpO2: 93 % O2 Device: (S) Oxymask Oxygen Delivery: 2 LPM      I/O last 3 completed shifts:  In: 1050 [IV Piggyback:1050]  Out: -     Constitutional: alert and no distress   Abdomen: Abdomen obese, soft, non-tender.              Data:   Data   Recent Labs   Lab 08/21/20  0644 08/20/20  1056   WBC 12.2* 17.0*   HGB 14.7 16.7*   MCV 99 97    192    134   POTASSIUM 4.1 4.4   CHLORIDE 108 103   CO2 28 27   BUN 12 13   CR 0.80 0.92   ANIONGAP 3 4   JACKIE 8.1* 9.0   GLC 85 108*        Shalini Munguia PA-C     Seen and agree.  Abdomen is minimally tender in the pelvis.  Otherwise soft.  Needs to stay due to fevers.  Soumya Palacios MD

## 2020-08-21 NOTE — PLAN OF CARE
To Do:  End of Shift Summary  For vital signs and complete assessments, please see documentation flowsheets.     Pertinent assessments: A&Ox4, ambulate independently in room, VSS, abdominal pain, Oxycodone given which was effective. +ve BS, passing gas, voiding adequately. NPO except meds and ice chips. Was nauseas, oral Zofran given.  Major Shift Events. Abdominal pain, nauseas  Treatment Plan: Surgery consult, pain management, IVF.    Discharge Readiness: Medically active  Expected Discharge Date: TBD  Discharge Disposition: Home with self care  Barriers/Criteria for discharge. COVID pending,  Surgery consult, pain management.

## 2020-08-21 NOTE — PLAN OF CARE
End of Shift Summary  For vital signs and complete assessments, please see documentation flowsheets.     Pertinent assessments: A&Ox4, ambulate independently in room, VSS, abdominal pain, Oxycodone 5 mg given which was effective. +ve BS, passing gas, voiding adequately. NPO except meds and ice chips.   Major Shift Events. Abdominal pain  Treatment Plan: Surgery consult, pain management, IVF.    Discharge Readiness: Medically active  Expected Discharge Date: TBD  Discharge Disposition: Home with self care  Barriers/Criteria for discharge. Surgery consult, pain management.

## 2020-08-21 NOTE — PROGRESS NOTES
[facility]    Medicine Progress Note - Hospitalist Service       Date of Admission:  8/20/2020  Length of stay: 1 days    Assessment & Plan   Josee Fairbanks is a 49-year-old female with a history of alcohol abuse now in remission, asthma, GERD, hypothyroidism, tobacco abuse, who is admitted to the hospitalist service with acute sigmoid diverticulitis with contained perforation.    Acute sigmoid diverticulitis with perforation  Sepsis secondary to the above  - Patient present with 4 days of abdominal pain with nausea and associated diarrhea.  Says she had similar pain 6 months ago but this resolved after she quit drinking.  - CT scan of the abdomen on admission showed a long segment of diverticulitis in the sigmoid colon with localized contained perforation but no abscess.  - Patient started on Zosyn in the ED.  - General surgery consulted.  - Current plans are to manage conservatively.  On 8/21, patient spiked fever and blood pressure dropped little bit, suggesting some sepsis.  Lactic acid was normal.  - Keep patient in hospital due to fevers.  Still n.p.o. per surgery.  Pain control with IV Dilaudid and oxycodone.  Patient met sepsis criteria on 8/21 with fever and leukocytosis. Pain about the same.  - Continue Zosyn.    Acute respiratory failure with hypoxia  - Patient needed 2 to 4 L to maintain saturations on 8/21.  Chest x-ray shows mild pulmonary vascular congestion, unclear what to make of that.  No history of heart failure.  May simply be related to acute illness with diverticulitis, continue to monitor.   - Has a history of COPD--continue LAMA/LABA/ICS    Chronic conditions  Tobacco abuse - smokes 1 ppd. Holding chantix while admitted  GERD - Protonix in IV form while NPO  Hypothyroidism - continue synthroid  Depression - continue sertraline    COVID 19 Negative  Diet:  NPO  DVT Prophylaxis: Low Risk/Ambulatory with no VTE prophylaxis indicated  Malnutrition: Not present  Tong Catheter:  No  Code Status: Full Code     Disposition Plan   Expected discharge:   1-2 days, would like to see improvement in fever and     Fili Roper MD  Hospitalist Service  [facility]  ______________________________________________________________________    Interval History   Patient had an uneventful night, but this morning her blood pressure dipped a little bit and she became febrile.  She was also seen to have some hypoxia.  She says she gets this way at home sometimes.  She says her abdominal pain is okay right now.  Otherwise, feels fine.    Data reviewed today: I reviewed all medications, new labs and imaging results over the last 24 hours.     Physical Exam   BP 91/50 (BP Location: Left arm)   Pulse 75   Temp 99  F (37.2  C) (Oral)   Resp 18   SpO2 96%   0 lbs 0 oz       General: Laying in bed, not in acute distress.    HEENT: No scleral icterus. Oropharynx moist.     Neck: Supple. Normal range of motion.     Pulmonary: Normal work of breathing. Clear to auscultation bilaterally.    Cardiovascular: Regular rate and rhythm without murmur or extra heart sounds.    Abdomen: Soft, generalized tenderness, worse bilaterally in the lower quadrants.    Extremities: No peripheral edema. No clubbing or cyanosis.     Neurologic: Awake, alert, appropriate.    Skin: Warm and dry.    Psychiatric: Normal affect and mood.     Data    Recent Labs   Lab 08/21/20  0644 08/20/20  1056   WBC 12.2* 17.0*   HGB 14.7 16.7*   MCV 99 97    192    134   POTASSIUM 4.1 4.4   CHLORIDE 108 103   CO2 28 27   BUN 12 13   CR 0.80 0.92   ANIONGAP 3 4   JACKIE 8.1* 9.0   GLC 85 108*     Recent Results (from the past 24 hour(s))   XR Chest Port 1 View    Narrative    CHEST PORTABLE ONE VIEW   8/21/2020 10:10 AM     HISTORY: Hypoxia.    COMPARISON: None.      Impression    IMPRESSION: Portable chest. Lungs are clear. Mild pulmonary vascular  congestion is present. Heart is normal in size. No pneumothorax.  No  definite pleural effusions.    IRON CHAN MD       Medications      Current Facility-Administered Medications   Medication Dose Route Frequency     fluticasone-vilanterol  1 puff Inhalation Daily    And     umeclidinium  1 puff Inhalation Daily     levothyroxine  125 mcg Oral Daily     nicotine   Transdermal Q8H     pantoprazole (PROTONIX) IV  20 mg Intravenous Daily with breakfast     piperacillin-tazobactam  4.5 g Intravenous Q8H     sodium chloride (PF)  3 mL Intracatheter Q8H

## 2020-08-21 NOTE — PROGRESS NOTES
Respiratory Therapy    Writer came into patient's room to administer Breo/Incruse inhalers.    Patient found to be snoring and dusky, pulse ox immediately applied, and patient verbally stimulated.     SpO2 55% with good reading, patient was on room air. Patient up to 71% when talking. Writer placed nasal cannula on patient @ 6lpm, SpO2 81%, Oxymask then applied at 10lpm SpO2 95%.    Patient has clear sleep apnea and should be wearing a CPAP or BIPAP due to extremely LOW oxygen.     Patient stated her CPAP mask was not comfortable.      Will inform RN and MD.      Hayley Veliz, RRT  8/21/2020

## 2020-08-22 ENCOUNTER — APPOINTMENT (OUTPATIENT)
Dept: CARDIOLOGY | Facility: CLINIC | Age: 50
End: 2020-08-22
Attending: INTERNAL MEDICINE
Payer: COMMERCIAL

## 2020-08-22 ENCOUNTER — APPOINTMENT (OUTPATIENT)
Dept: CT IMAGING | Facility: CLINIC | Age: 50
End: 2020-08-22
Attending: INTERNAL MEDICINE
Payer: COMMERCIAL

## 2020-08-22 LAB
ALBUMIN SERPL-MCNC: 2.8 G/DL (ref 3.4–5)
ALP SERPL-CCNC: 115 U/L (ref 40–150)
ALT SERPL W P-5'-P-CCNC: 45 U/L (ref 0–50)
AMMONIA PLAS-SCNC: <10 UMOL/L (ref 10–50)
AMPHETAMINES UR QL SCN: NEGATIVE
ANION GAP SERPL CALCULATED.3IONS-SCNC: 8 MMOL/L (ref 3–14)
AST SERPL W P-5'-P-CCNC: 45 U/L (ref 0–45)
BARBITURATES UR QL: NEGATIVE
BENZODIAZ UR QL: NEGATIVE
BILIRUB SERPL-MCNC: 0.6 MG/DL (ref 0.2–1.3)
BUN SERPL-MCNC: 11 MG/DL (ref 7–30)
CALCIUM SERPL-MCNC: 8.4 MG/DL (ref 8.5–10.1)
CANNABINOIDS UR QL SCN: NEGATIVE
CHLORIDE SERPL-SCNC: 105 MMOL/L (ref 94–109)
CO2 SERPL-SCNC: 24 MMOL/L (ref 20–32)
COCAINE UR QL: NEGATIVE
CREAT SERPL-MCNC: 0.79 MG/DL (ref 0.52–1.04)
ERYTHROCYTE [DISTWIDTH] IN BLOOD BY AUTOMATED COUNT: 14.4 % (ref 10–15)
GFR SERPL CREATININE-BSD FRML MDRD: 89 ML/MIN/{1.73_M2}
GLUCOSE SERPL-MCNC: 88 MG/DL (ref 70–99)
HCT VFR BLD AUTO: 48.7 % (ref 35–47)
HGB BLD-MCNC: 14.3 G/DL (ref 11.7–15.7)
MCH RBC QN AUTO: 30 PG (ref 26.5–33)
MCHC RBC AUTO-ENTMCNC: 29.4 G/DL (ref 31.5–36.5)
MCV RBC AUTO: 102 FL (ref 78–100)
NT-PROBNP SERPL-MCNC: 449 PG/ML (ref 0–450)
OPIATES UR QL SCN: NEGATIVE
PCP UR QL SCN: NEGATIVE
PLATELET # BLD AUTO: 166 10E9/L (ref 150–450)
POTASSIUM SERPL-SCNC: 4.4 MMOL/L (ref 3.4–5.3)
PROT SERPL-MCNC: 7.2 G/DL (ref 6.8–8.8)
RBC # BLD AUTO: 4.76 10E12/L (ref 3.8–5.2)
SODIUM SERPL-SCNC: 137 MMOL/L (ref 133–144)
TSH SERPL DL<=0.005 MIU/L-ACNC: 1.37 MU/L (ref 0.4–4)
WBC # BLD AUTO: 11.2 10E9/L (ref 4–11)

## 2020-08-22 PROCEDURE — C9113 INJ PANTOPRAZOLE SODIUM, VIA: HCPCS | Performed by: PHYSICIAN ASSISTANT

## 2020-08-22 PROCEDURE — 99233 SBSQ HOSP IP/OBS HIGH 50: CPT | Performed by: INTERNAL MEDICINE

## 2020-08-22 PROCEDURE — 40000275 ZZH STATISTIC RCP TIME EA 10 MIN

## 2020-08-22 PROCEDURE — 36415 COLL VENOUS BLD VENIPUNCTURE: CPT | Performed by: INTERNAL MEDICINE

## 2020-08-22 PROCEDURE — 93306 TTE W/DOPPLER COMPLETE: CPT | Mod: 26 | Performed by: INTERNAL MEDICINE

## 2020-08-22 PROCEDURE — 83880 ASSAY OF NATRIURETIC PEPTIDE: CPT | Performed by: INTERNAL MEDICINE

## 2020-08-22 PROCEDURE — 80053 COMPREHEN METABOLIC PANEL: CPT | Performed by: INTERNAL MEDICINE

## 2020-08-22 PROCEDURE — 25000132 ZZH RX MED GY IP 250 OP 250 PS 637: Performed by: INTERNAL MEDICINE

## 2020-08-22 PROCEDURE — 94640 AIRWAY INHALATION TREATMENT: CPT

## 2020-08-22 PROCEDURE — 93306 TTE W/DOPPLER COMPLETE: CPT

## 2020-08-22 PROCEDURE — 12000000 ZZH R&B MED SURG/OB

## 2020-08-22 PROCEDURE — 25000128 H RX IP 250 OP 636: Performed by: INTERNAL MEDICINE

## 2020-08-22 PROCEDURE — 25000128 H RX IP 250 OP 636: Performed by: PHYSICIAN ASSISTANT

## 2020-08-22 PROCEDURE — 84443 ASSAY THYROID STIM HORMONE: CPT | Performed by: INTERNAL MEDICINE

## 2020-08-22 PROCEDURE — 71275 CT ANGIOGRAPHY CHEST: CPT

## 2020-08-22 PROCEDURE — 82140 ASSAY OF AMMONIA: CPT | Performed by: INTERNAL MEDICINE

## 2020-08-22 PROCEDURE — 25800030 ZZH RX IP 258 OP 636: Performed by: INTERNAL MEDICINE

## 2020-08-22 PROCEDURE — 25000132 ZZH RX MED GY IP 250 OP 250 PS 637: Performed by: PHYSICIAN ASSISTANT

## 2020-08-22 PROCEDURE — 25800030 ZZH RX IP 258 OP 636: Performed by: PHYSICIAN ASSISTANT

## 2020-08-22 PROCEDURE — 80307 DRUG TEST PRSMV CHEM ANLYZR: CPT | Performed by: INTERNAL MEDICINE

## 2020-08-22 PROCEDURE — 85027 COMPLETE CBC AUTOMATED: CPT | Performed by: INTERNAL MEDICINE

## 2020-08-22 RX ORDER — PROCHLORPERAZINE MALEATE 5 MG
5 TABLET ORAL EVERY 6 HOURS PRN
Status: DISCONTINUED | OUTPATIENT
Start: 2020-08-22 | End: 2020-08-23 | Stop reason: HOSPADM

## 2020-08-22 RX ORDER — FUROSEMIDE 10 MG/ML
20 INJECTION INTRAMUSCULAR; INTRAVENOUS ONCE
Status: COMPLETED | OUTPATIENT
Start: 2020-08-22 | End: 2020-08-22

## 2020-08-22 RX ORDER — IOPAMIDOL 755 MG/ML
86 INJECTION, SOLUTION INTRAVASCULAR ONCE
Status: COMPLETED | OUTPATIENT
Start: 2020-08-22 | End: 2020-08-22

## 2020-08-22 RX ADMIN — OXYCODONE HYDROCHLORIDE 5 MG: 5 TABLET ORAL at 05:36

## 2020-08-22 RX ADMIN — TAZOBACTAM SODIUM AND PIPERACILLIN SODIUM 4.5 G: 500; 4 INJECTION, SOLUTION INTRAVENOUS at 13:19

## 2020-08-22 RX ADMIN — FUROSEMIDE 20 MG: 10 INJECTION, SOLUTION INTRAMUSCULAR; INTRAVENOUS at 12:20

## 2020-08-22 RX ADMIN — LEVOTHYROXINE SODIUM 125 MCG: 125 TABLET ORAL at 08:05

## 2020-08-22 RX ADMIN — TAZOBACTAM SODIUM AND PIPERACILLIN SODIUM 4.5 G: 500; 4 INJECTION, SOLUTION INTRAVENOUS at 05:34

## 2020-08-22 RX ADMIN — SODIUM CHLORIDE 20 MG: 9 INJECTION, SOLUTION INTRAVENOUS at 08:06

## 2020-08-22 RX ADMIN — ONDANSETRON 4 MG: 2 INJECTION INTRAMUSCULAR; INTRAVENOUS at 10:07

## 2020-08-22 RX ADMIN — SODIUM CHLORIDE: 9 INJECTION, SOLUTION INTRAVENOUS at 05:39

## 2020-08-22 RX ADMIN — ACETAMINOPHEN 650 MG: 325 TABLET, FILM COATED ORAL at 14:58

## 2020-08-22 RX ADMIN — TAZOBACTAM SODIUM AND PIPERACILLIN SODIUM 4.5 G: 500; 4 INJECTION, SOLUTION INTRAVENOUS at 20:49

## 2020-08-22 RX ADMIN — UMECLIDINIUM 1 PUFF: 62.5 AEROSOL, POWDER ORAL at 07:46

## 2020-08-22 RX ADMIN — SERTRALINE HYDROCHLORIDE 50 MG: 50 TABLET ORAL at 08:05

## 2020-08-22 RX ADMIN — IOPAMIDOL 86 ML: 755 INJECTION, SOLUTION INTRAVENOUS at 18:04

## 2020-08-22 RX ADMIN — FLUTICASONE FUROATE AND VILANTEROL TRIFENATATE 1 PUFF: 100; 25 POWDER RESPIRATORY (INHALATION) at 07:46

## 2020-08-22 ASSESSMENT — ACTIVITIES OF DAILY LIVING (ADL)
ADLS_ACUITY_SCORE: 14

## 2020-08-22 ASSESSMENT — MIFFLIN-ST. JEOR: SCORE: 1762.16

## 2020-08-22 NOTE — PROGRESS NOTES
[facility]    General Surgery Progress Note 8/21/2020         Assessment and Plan:   Assessment:    Josee Fairbanks is a 49 year old female admitted with diverticulitis.  ABD CT findings of long segment of diverticulitis in the sigmoid colon with localized contained perforation but no abscess   - WBC improved today 11.2 from 12.2 from 17.0 on admission  - Blood culture and urine culture NTD   - fever of 103.1 yesterday 8am, 100.4 at 6pm, now afebrile  - Hypoxia      Plan:   Pain managment: Oxycodone, Tylenol  Bowel: +gas, -BM   Diet: clears - ADAT today  IVFs: NS @ 100 mL/hr  Activity: encourage ambulation 3-4 x daily  Antibiotics: on Zosyn  DVT prophylaxis: ambulation  Remain inpatient until afebrile 24hrs, raymond PO, having BMs         Interval History:   Sleeping in bed, wakes to voice but states she is very sleepy. Thinks pain in LLQ is about the same. Passing gas, no bowel movement. Walking to bathroom.  On 4L O2 this am         Physical Exam:   Temp: 98.9  F (37.2  C) Temp src: Oral BP: (!) 161/76 Pulse: 77   Resp: 20   SpO2: 94 % O2 Device: Oxymask Oxygen Delivery: 4 LPM      I/O last 3 completed shifts:  In: 2895 [P.O.:240; I.V.:2655]  Out: -     Constitutional: quite sleepy and no distress   Abdomen: Abdomen obese, soft, mildly tender LLQ              Data:   Data   Recent Labs   Lab 08/22/20  0731 08/21/20  0644 08/20/20  1056   WBC 11.2* 12.2* 17.0*   HGB 14.3 14.7 16.7*   * 99 97    150 192   NA  --  139 134   POTASSIUM  --  4.1 4.4   CHLORIDE  --  108 103   CO2  --  28 27   BUN  --  12 13   CR  --  0.80 0.92   ANIONGAP  --  3 4   JACKIE  --  8.1* 9.0   GLC  --  85 108*        Laura Acuna MD

## 2020-08-22 NOTE — PROGRESS NOTES
[facility]    Medicine Progress Note - Hospitalist Service       Date of Admission:  8/20/2020  Length of stay: 2 days    Assessment & Plan   Josee Fairbanks is a 49-year-old female with a history of alcohol abuse now in remission, asthma, GERD, hypothyroidism, tobacco abuse, who is admitted to the hospitalist service with acute sigmoid diverticulitis with contained perforation.    General surgery consulted on admission, currently recommending conservative management.  Overall seems to have been improving from a diverticulitis standpoint with trend down in her white count.  She did have an episode of fever.  Her abdominal pain is doing okay.  Confusingly, she developed an oxygen requirement of some significance to 4 L.  She says she has been on oxygen in the past for COPD but does not use it very often.  Currently working that up with an echocardiogram.  Patient will remain in the hospital until she is 24 hours free of fever related to diverticulitis and with a plan for her hypoxia.    Acute sigmoid diverticulitis with perforation  Sepsis secondary to the above  - Patient present with 4 days of abdominal pain with nausea and associated diarrhea.  Says she had similar pain 6 months ago but this resolved after she quit drinking.  - Met sepsis criteria on admission with WBC of 17 and tachycardia  - CT scan of the abdomen on admission showed a long segment of diverticulitis in the sigmoid colon with localized contained perforation but no abscess.  - Patient started on Zosyn in the ED.  - General surgery consulted.  - Current plans are to manage conservatively.  On 8/21, patient was febrile to 103 and WBC still elevated.  Lactic acid was normal.  - Continue Zosyn.    Acute respiratory failure with hypoxia  - Patient needed 2 to 4 L to maintain saturations on 8/21.  Chest x-ray shows mild pulmonary vascular congestion, unclear what to make of that.  No history of heart failure.  May simply be related to  "acute illness with diverticulitis, continue to monitor.   - Has a history of COPD--continue LAMA/LABA/ICS--no sign of COPD exacerbation without increase in sputum or cough, holding off on steroids, particularly in light of infection  - Check echocardiogram on 8/22 given appearance of vascular congestion  - Lasix 20 mg IV x1 on 8/22  - I doubt clinically PE but if Echo unrevealing will get CT PE protocol.     Acute encephalopathy--uncertain etiology  - Patient complaining of seeing things to nurse, feeling agitated/confused, seems oriented on exam  - Patient has a history of meth and alcohol use in the past but adamantly denies using those recently. States last time she did meth was 18 years ago and that she has been sober for months.  - Check ammonia level, TSH, UDS    Chronic conditions  Tobacco abuse - smokes 1 ppd. Holding chantix while admitted  GERD - Protonix in IV form while NPO  Hypothyroidism - continue synthroid  Depression - continue sertraline    COVID 19 Negative  Diet:  NPO  DVT Prophylaxis: Low Risk/Ambulatory with no VTE prophylaxis indicated  Malnutrition: Not present  Tong Catheter: No  Code Status: Full Code     Disposition Plan   Expected discharge:   1-2 days. Should be 24h free of fever and need to have O2 requirement figured out.     Fili Roper MD  Hospitalist Service  [facility]  ______________________________________________________________________    Interval History   RN stated patient seemed off today. Patient is a little more sleepy than she has been. She desaturated to the 60s when off of O2 apparently, but says her breathing feels completely fine and like it does all the time at home.     Data reviewed today: I reviewed all medications, new labs and imaging results over the last 24 hours.     Physical Exam   BP (!) 161/76 (BP Location: Left arm)   Pulse 77   Temp 98.9  F (37.2  C) (Oral)   Resp 20   Ht 1.626 m (5' 4\")   SpO2 94%   0 lbs 0 oz "       General: Laying on her side in the bed, not in acute distress.     HEENT: No scleral icterus. Oropharynx moist.     Neck: Supple. Normal range of motion.     Pulmonary: Normal work of breathing. Clear bilaterally, no wheeze or crackles.     Cardiovascular: Regular rate and rhythm without murmur or extra heart sounds.    Abdomen: Soft, generalized tenderness, worse bilaterally in the lower quadrants.    Extremities: No peripheral edema. No clubbing or cyanosis.     Neurologic: Awake, alert, appropriate.    Skin: Warm and dry.    Psychiatric: Normal affect and mood.     Data    Recent Labs   Lab 08/22/20  0731 08/21/20  0644 08/20/20  1056   WBC 11.2* 12.2* 17.0*   HGB 14.3 14.7 16.7*   * 99 97    150 192   NA  --  139 134   POTASSIUM  --  4.1 4.4   CHLORIDE  --  108 103   CO2  --  28 27   BUN  --  12 13   CR  --  0.80 0.92   ANIONGAP  --  3 4   JACKIE  --  8.1* 9.0   GLC  --  85 108*     No results found for this or any previous visit (from the past 24 hour(s)).    Medications      Current Facility-Administered Medications   Medication Dose Route Frequency     fluticasone-vilanterol  1 puff Inhalation Daily    And     umeclidinium  1 puff Inhalation Daily     furosemide  20 mg Intravenous Once     levothyroxine  125 mcg Oral Daily     nicotine   Transdermal Q8H     [START ON 8/23/2020] pantoprazole (PROTONIX) IV  20 mg Intravenous Daily with breakfast     piperacillin-tazobactam  4.5 g Intravenous Q8H     sertraline  50 mg Oral Daily     sodium chloride (PF)  3 mL Intracatheter Q8H

## 2020-08-22 NOTE — PLAN OF CARE
"End of Shift Summary  For vital signs and complete assessments, please see documentation flowsheets.     Pertinent assessments: A&Ox4, ambulate SBA in room, VSS, abdominal pain,  BS +, passing gas, voiding adequately. Clear liquid diet. Zofran given x1.  Major Shift Events. Pt very sleepy reported weakness, feeling confused stated \"reached for bag that wasn't there\" Echo today, UDS negative. Abdominal pain. Chest CT.   Treatment Plan: Surgery consult, pain management, IVF, echo.      Discharge Readiness: Medically active  Expected Discharge Date: TBD  Discharge Disposition: Home with self care  Barriers/Criteria for discharge. COVID negative  Surgery consult, pain management.          "

## 2020-08-22 NOTE — PROGRESS NOTES
"/57 (BP Location: Right arm)   Pulse 81   Temp 98.1  F (36.7  C) (Oral)   Resp 22   Ht 1.626 m (5' 4\")   SpO2 90%   Patient alert and oriented. Complained of pain 6/10 Oxycodone given x1 5mg. 2.5L of O2 sats in the 90's. Up independent in the room. Voiding without difficulty. IV Zosyn. Active bowel sounds, passing flatus. Tolerating ice chips and sips of water. Will continue to monitor and provide supportive cares.  "

## 2020-08-23 VITALS
TEMPERATURE: 97.4 F | HEIGHT: 64 IN | SYSTOLIC BLOOD PRESSURE: 163 MMHG | OXYGEN SATURATION: 91 % | HEART RATE: 75 BPM | BODY MASS INDEX: 42.99 KG/M2 | WEIGHT: 251.8 LBS | DIASTOLIC BLOOD PRESSURE: 90 MMHG | RESPIRATION RATE: 18 BRPM

## 2020-08-23 PROCEDURE — 94640 AIRWAY INHALATION TREATMENT: CPT

## 2020-08-23 PROCEDURE — 25000128 H RX IP 250 OP 636: Performed by: PHYSICIAN ASSISTANT

## 2020-08-23 PROCEDURE — 40000275 ZZH STATISTIC RCP TIME EA 10 MIN

## 2020-08-23 PROCEDURE — C9113 INJ PANTOPRAZOLE SODIUM, VIA: HCPCS | Performed by: INTERNAL MEDICINE

## 2020-08-23 PROCEDURE — 25000128 H RX IP 250 OP 636: Performed by: INTERNAL MEDICINE

## 2020-08-23 PROCEDURE — 25000132 ZZH RX MED GY IP 250 OP 250 PS 637: Performed by: INTERNAL MEDICINE

## 2020-08-23 PROCEDURE — 25000132 ZZH RX MED GY IP 250 OP 250 PS 637: Performed by: PHYSICIAN ASSISTANT

## 2020-08-23 PROCEDURE — 99239 HOSP IP/OBS DSCHRG MGMT >30: CPT | Performed by: INTERNAL MEDICINE

## 2020-08-23 RX ORDER — PANTOPRAZOLE SODIUM 20 MG/1
20 TABLET, DELAYED RELEASE ORAL
Status: DISCONTINUED | OUTPATIENT
Start: 2020-08-24 | End: 2020-08-23 | Stop reason: HOSPADM

## 2020-08-23 RX ADMIN — FLUTICASONE FUROATE AND VILANTEROL TRIFENATATE 1 PUFF: 100; 25 POWDER RESPIRATORY (INHALATION) at 08:29

## 2020-08-23 RX ADMIN — LEVOTHYROXINE SODIUM 125 MCG: 125 TABLET ORAL at 08:00

## 2020-08-23 RX ADMIN — PANTOPRAZOLE SODIUM 20 MG: 40 INJECTION, POWDER, FOR SOLUTION INTRAVENOUS at 08:01

## 2020-08-23 RX ADMIN — IBUPROFEN 600 MG: 600 TABLET, FILM COATED ORAL at 06:42

## 2020-08-23 RX ADMIN — TAZOBACTAM SODIUM AND PIPERACILLIN SODIUM 4.5 G: 500; 4 INJECTION, SOLUTION INTRAVENOUS at 04:33

## 2020-08-23 RX ADMIN — UMECLIDINIUM 1 PUFF: 62.5 AEROSOL, POWDER ORAL at 08:29

## 2020-08-23 RX ADMIN — SERTRALINE HYDROCHLORIDE 50 MG: 50 TABLET ORAL at 08:00

## 2020-08-23 ASSESSMENT — ACTIVITIES OF DAILY LIVING (ADL)
ADLS_ACUITY_SCORE: 14

## 2020-08-23 NOTE — PLAN OF CARE
A&O x 4. Pt stable and ready for discharge, Vital signs stable. Discharge instructions, signs and symptoms to report, new medications, diet, activity, and follow up appointments reviewed with patient. New Rx sent home with pt and education on each new medication.   All questions answered and verbalized understanding.    IV out and belongings sent home with pt  Pt aware to use her Oxygen PRN and monitor her sats close

## 2020-08-23 NOTE — DISCHARGE SUMMARY
"[facility]  Hospitalist Discharge Summary       Date of Admission:  8/20/2020  Date of Discharge:  8/23/2020  Discharging Provider: Fili Roper MD      Discharge Diagnoses   Acute sigmoid diverticulitis with contained perforation  Acute on chronic hypoxic respiratory failure    Follow-ups Needed After Discharge   Follow-up Appointments     Follow-up and recommended labs and tests       Follow up with primary care provider, [facility], within   7-14 days for hospital follow up.             Unresulted Labs Ordered in the Past 30 Days of this Admission     Date and Time Order Name Status Description    8/20/2020 1312 Blood culture Preliminary     8/20/2020 1312 Blood culture Preliminary       These results will be followed up by [facility]    Discharge Disposition   Discharged to home  Condition at discharge: Stable    History of Present Illness   Per admission H&P:  \"Josee Fairbanks is a 49 year old female with a PMH significant for alcohol abuse, now in remission, asthma, GERD and hypothyroidism, who presents with lower abdominal pain and nausea.  Patient reports 4 days of lower abdominal pain with associated nausea.  She initially thought that she was constipated so took mag citrate and subsequently developed diarrhea.  She does report some bloatedness stool.  She endorses feeling hot and cold related to her pain but denies having measured fevers.  She also denies chest pain, shortness of breath, cough, vomiting or dysuria.  She denies a history of diverticulitis but has had a colonoscopy.  Patient reports similar pain about 6 months ago but this was in the setting of heavy alcohol use and was admitted to the hospital for alcohol withdrawal at that time. She is a smoker, ~1 pack per day.   In the ED, vital signs were initially stable.  She did develop mild hypoxia, SPO2 88% on room air, this improved to 96% on 2 L via nasal cannula.  BMP is unremarkable.  CBC is significant for a " "elevated WBC at 17, hemoglobin 16.7, platelets are normal at 192.  Lactic acid is within normal limits at 0.5.  UA is abnormal with large leukocyte esterase and 21 white blood cells and trace blood.  Blood cultures are drawn and pending.  CT of the abdomen pelvis was obtained which shows a long segment of diverticulitis in the sigmoid colon with localized contained perforation and no evidence of abscess.  She was given 1 L normal saline bolus x2, 50 mg IV Toradol, 5 mg IV Reglan, and started on IV Zosyn.  She will be admitted for further management of acute diverticulitis with perforation.  General surgery was made aware of the patient in the ED, no indication for emergent surgical intervention at this time.\"    Hospital Course     Acute sigmoid diverticulitis with perforation  Sepsis secondary to the above  Patient presented with 4 days of abdominal pain with nausea and associated diarrhea.  Says she had similar pain 6 months ago but this resolved after she quit drinking. She met sepsis criteria on admission with WBC of 17 and tachycardia. CT scan of the abdomen on admission showed a long segment of diverticulitis in the sigmoid colon with localized contained perforation but no abscess.    She was started on Zosyn. General surgery was consulted. She improved with conservative management and was pain-free and tolerating a low fiber diet on the day of discharge. Will follow up with a local surgeon in [place]. Discharge on oral Augmentin to complete a 10 day course.     Acute respiratory failure with hypoxia  Patient needed 2 to 4 L to maintain saturations.  Chest x-ray showed mild pulmonary vascular congestion but she had no history of heart failure.  Echocardiogram showed no abnormality.  CT pulmonary angiogram was negative for PE however it showed mild centrilobular emphysema and scattered fibrosis in both lungs.  Patient has a history of COPD and was continued on LAMA/LABA/ICS. She had no findings c/w COPD " exacerbation.  Patient said she has been on oxygen in the past.  She did not want any further work-up for her oxygen dependence.  She says she has oxygen at home.  She will be discharged on her home inhaler regimen.  Can further work this up as an outpatient if she desires.    Consultations This Hospital Stay   SURGERY GENERAL IP CONSULT      Code Status   Full Code    Time Spent on this Encounter   I, Fili Roper MD, personally saw the patient today and spent greater than 30 minutes discharging this patient.       Fili Roper MD  [facility]  ______________________________________________________________________    Physical Exam   Vital Signs: Temp: 97.4  F (36.3  C) Temp src: Oral BP: (!) 163/90 Pulse: 75   Resp: 18 SpO2: 91 % O2 Device: Nasal cannula Oxygen Delivery: 3 LPM  Weight: 251 lbs 12.8 oz      General: Laying in bed, no distress.    HEENT: No scleral icterus. Oropharynx moist.     Neck: Supple. Normal range of motion.    Pulmonary: Normal work of breathing. Clear to auscultation bilaterally.    Cardiovascular: Regular rate and rhythm without murmur or extra heart sounds.    Abdomen: Soft and non-tender.    Extremities: No peripheral edema. No clubbing.    Neurologic: Awake, alert, appropriate.    Skin: Warm and dry.    Psychiatric: Normal affect and mood.       Primary Care Physician   [facility]    Discharge Orders      Reason for your hospital stay    You were admitted for diverticulitis, which is an infection of the colon. This improved with IV antibiotics and you will continue to take oral antibiotics on discharge. We could not figure out exactly what caused your oxygen to be low, but we recommend wearing your oxygen at home and checking your oxygen levels.     Follow-up and recommended labs and tests     Follow up with primary care provider, [facility], within 7-14 days for hospital follow up.     Activity    Your activity upon discharge:  As tolerated     Diet    Follow this diet upon discharge: Low fiber       Significant Results and Procedures   Most Recent 3 CBC's:  Recent Labs   Lab Test 08/22/20  0731 08/21/20  0644 08/20/20  1056   WBC 11.2* 12.2* 17.0*   HGB 14.3 14.7 16.7*   * 99 97    150 192     Most Recent 3 BMP's:  Recent Labs   Lab Test 08/22/20  0731 08/21/20  0644 08/20/20  1056    139 134   POTASSIUM 4.4 4.1 4.4   CHLORIDE 105 108 103   CO2 24 28 27   BUN 11 12 13   CR 0.79 0.80 0.92   ANIONGAP 8 3 4   JACKIE 8.4* 8.1* 9.0   GLC 88 85 108*     Most Recent 2 LFT's:  Recent Labs   Lab Test 08/22/20  0731   AST 45   ALT 45   ALKPHOS 115   BILITOTAL 0.6     Most Recent 3 INR's:No lab results found.,   Results for orders placed or performed during the hospital encounter of 08/20/20   CT Abdomen Pelvis w Contrast    Narrative    CT ABDOMEN AND PELVIS WITH CONTRAST 8/20/2020 12:53 PM    HISTORY: Abdominal pain and bloody stool. Suspect diverticulitis.    TECHNIQUE: Helical axial scans from dome of liver through pubic  symphysis with 100 mL Isovue-370 IV contrast. Radiation dose for this  scan was reduced using automated exposure control, adjustment of the  mA and/or kV according to patient size, or iterative reconstruction  technique.    COMPARISON: None.    FINDINGS: Scattered platelike atelectasis or linear scarring in the  posterior lung bases bilaterally. Status post cholecystectomy. The  liver, spleen, pancreas, bilateral adrenal glands and right kidney are  unremarkable. There is a 1 cm benign cyst in the mid posterior left  renal cortex, and the left kidney is otherwise normal. Minimal  vascular calcifications. The bowel and mesentery in the upper abdomen  appear normal.    Scans through the pelvis show extensive thickening of the wall and  surrounding inflammatory induration related to a 9 cm length of  sigmoid colon. There is inflammatory thickening of the sigmoid  mesentery. Scattered diverticula are seen, and the  most likely  diagnosis is diverticulitis. There is no evidence for abscess, but  there is a small amount of extraluminal gas in the mesentery just  superior to the sigmoid colon consistent with perforation (images 71  through 76 of series 5 and images 65 through 73 of series 4). There is  no free fluid. The appendix is identified, and there is no evidence  for appendicitis, although a 5 mm appendicolith could be present in  the proximal appendix (image 62 of series 2). A few surgical clips are  seen in the left pelvis adjacent to the external iliac vessels (image  68 of series 2). Prior hysterectomy.      Impression    IMPRESSION:  1. Long segment of diverticulitis in the sigmoid colon with localized  contained perforation but no abscess.  2. Possible appendicolith.  3. Small benign cyst left kidney. Minimal vascular calcifications.    JENAE LACY MD   XR Chest Port 1 View    Narrative    CHEST PORTABLE ONE VIEW   8/21/2020 10:10 AM     HISTORY: Hypoxia.    COMPARISON: None.      Impression    IMPRESSION: Portable chest. Lungs are clear. Mild pulmonary vascular  congestion is present. Heart is normal in size. No pneumothorax. No  definite pleural effusions.    IRON CHAN MD   CT Chest Pulmonary Embolism w Contrast    Narrative    EXAM: CT CHEST PULMONARY EMBOLISM W CONTRAST  LOCATION: [facility]  DATE/TIME: 8/22/2020 6:02 PM    INDICATION: History of smoking. Shortness of breath. Concern for pulmonary embolus or parenchymal disease.  COMPARISON: None.  TECHNIQUE: CT chest pulmonary angiogram during arterial phase injection of IV contrast. Multiplanar reformats and MIP reconstructions were performed. Dose reduction techniques were used.   CONTRAST: 86 mL Isovue-370    FINDINGS:  ANGIOGRAM CHEST: Pulmonary arteries are normal caliber and negative for pulmonary emboli. Thoracic aorta is negative for dissection. No CT evidence of right heart strain.    LUNGS AND PLEURA: Mild changes of centrilobular  emphysema. Dependent atelectasis both lower lobes with a few scattered strands of fibrosis. No acute infiltrates or pleural effusions.    MEDIASTINUM/AXILLAE: No enlarged mediastinal or hilar lymph nodes.    UPPER ABDOMEN: Cholecystectomy.    MUSCULOSKELETAL: Old right third rib and left eighth rib fractures.      Impression    IMPRESSION:  1.  No evidence for pulmonary emboli.  2.  Mild changes of centrilobular emphysema.  3.  Dependent atelectasis both lower lobes and a few scattered strands of fibrosis both lungs.   4.  No definite findings for acute pulmonary disease.    Echocardiogram Complete    Narrative    [id number]  [id number]  [id number]  [id number]^CALIN^DORON^[facility]  Echocardiography Laboratory  [address] [place] [address]        Name: MANFRED MCNEAL  MRN: [id number]  : 1970  Study Date: 2020 01:34 PM  Age: 49 yrs  Gender: Female  Patient [facility]  Reason For Study: CARRION  Ordering Physician: DORON LAYTON  Referring Physician: Garret Rivera  Performed By: Keyana Sarabia     BSA: 2.2 m2  Height: 64 in  Weight: 253 lb  BP: 161/76 mmHg  _____________________________________________________________________________  __        Procedure  Complete Portable Echo Adult.  _____________________________________________________________________________  __        Interpretation Summary     Left ventricular systolic function is normal.The visual ejection fraction is  estimated at 60-65%.  The right ventricular systolic function is normal.  No significant valvular abnormalities.  The inferior vena cava was normal in size with preserved respiratory  variability.  _____________________________________________________________________________  __        Left Ventricle  The left ventricle is normal in size. There is normal left ventricular wall  thickness. Left ventricular systolic function is normal. The visual ejection  fraction is  estimated at 60-65%. Left ventricular diastolic function is  normal. No regional wall motion abnormalities noted.     Right Ventricle  The right ventricle is normal size. The right ventricular systolic function is  normal.     Atria  Normal left atrial size. Right atrial size is normal.     Mitral Valve  There is trace mitral regurgitation.        Tricuspid Valve  Normal tricuspid valve. Right ventricular systolic pressure could not be  approximated due to inadequate tricuspid regurgitation.     Aortic Valve  The aortic valve is not well visualized. No aortic regurgitation is present.  No aortic stenosis is present.     Pulmonic Valve  The pulmonic valve is not well visualized. There is trace pulmonic valvular  regurgitation. There is no pulmonic valvular stenosis.     Vessels  The aortic root is normal size. Normal size ascending aorta. The inferior vena  cava was normal in size with preserved respiratory variability.     Pericardium  There is no pericardial effusion.     _____________________________________________________________________________  __  MMode/2D Measurements & Calculations  IVSd: 0.94 cm  LVIDd: 4.6 cm  LVIDs: 3.0 cm  LVPWd: 0.73 cm  FS: 35.5 %     LV mass(C)d: 125.0 grams  LV mass(C)dI: 57.8 grams/m2  Ao root diam: 3.3 cm  asc Aorta Diam: 3.4 cm  LVOT diam: 2.3 cm  LVOT area: 4.1 cm2  LA Volume (BP): 47.8 ml  LA Volume Index (BP): 22.1 ml/m2  RWT: 0.32        Doppler Measurements & Calculations  MV E max becky: 100.9 cm/sec  MV A max becky: 65.5 cm/sec  MV E/A: 1.5     MV dec time: 0.20 sec  PA acc time: 0.09 sec  E/E' av.2  Lateral E/e': 6.1  Medial E/e': 8.3           _____________________________________________________________________________  __           Report approved by: Laurent De La Vega 2020 03:18 PM            Discharge Medications   Current Discharge Medication List      START taking these medications    Details   amoxicillin-clavulanate (AUGMENTIN) 875-125 MG tablet Take 1  tablet by mouth 2 times daily for 7 days  Qty: 14 tablet, Refills: 0    Associated Diagnoses: Diverticulitis of large intestine with perforation without bleeding         CONTINUE these medications which have NOT CHANGED    Details   Fluticasone-Umeclidin-Vilanterol (TRELEGY ELLIPTA) 100-62.5-25 MCG/INH oral inhaler Inhale 1 puff into the lungs daily      levothyroxine (SYNTHROID/LEVOTHROID) 125 MCG tablet Take 125 mcg by mouth daily      omeprazole (PRILOSEC) 20 MG DR capsule Take 20 mg by mouth daily as needed      ondansetron (ZOFRAN) 4 MG tablet Take 4 mg by mouth 3 times daily as needed      PROAIR  (90 Base) MCG/ACT inhaler Inhale 2 puffs into the lungs every 4 hours as needed for shortness of breath / dyspnea    Comments: Pharmacy may dispense brand covered by insurance (Proair, or proventil or ventolin or generic albuterol inhaler)      sertraline (ZOLOFT) 50 MG tablet Take 50 mg by mouth daily      traZODone (DESYREL) 150 MG tablet Take 150 mg by mouth nightly as needed      varenicline (CHANTIX) 1 MG tablet Take 1 mg by mouth 2 times daily      budesonide (PULMICORT) 0.5 MG/2ML neb solution Take 0.5 mg by nebulization 2 times daily as needed            Allergies   Allergies   Allergen Reactions     Prednisone Other (See Comments)     Shaky, insomnia, urinary frequency, stomach upset     Topiramate Other (See Comments)     forgetful

## 2020-08-23 NOTE — PROGRESS NOTES
[facility]    General Surgery Progress Note 8/21/2020         Assessment and Plan:   Assessment:    Josee Fairbanks is a 49 year old female admitted with diverticulitis.  ABD CT findings of long segment of diverticulitis in the sigmoid colon with localized contained perforation but no abscess   - WBC improved today 11.2 from 12.2 from 17.0 on admission  - Blood cultureNTD  and urine culture with mixed otis    - afebrile  - Hypoxia      Plan:   Pain managment: Oxycodone, Tylenol, Ibprofen  Bowel: +gas, +BM   Diet: low fiber  IVFs: saline lock   Activity: encourage ambulation 3-4 x daily  Antibiotics: on Zosyn - discharge home with 7 more days antibiotics cipro/flagyl or augmentin  DVT prophylaxis: ambulation  Ok from surgical perspective to discharge home, will follow up with surgeon at home in [place], [place]         Interval History:   Sleeping in bed, easily awakens to voice.  States she is feeling good with exception of back pain and headache.  Denies abdominal pain.  Tolerating diet.  Passing gas and having bowel movements.  Wants to go home.          Physical Exam:   Temp: 97.4  F (36.3  C) Temp src: Oral BP: (!) 163/90 Pulse: 75   Resp: 18   SpO2: 91 % O2 Device: Nasal cannula Oxygen Delivery: 3 LPM      I/O last 3 completed shifts:  In: 2934 [P.O.:120; I.V.:2814]  Out: 1000 [Urine:1000]    Constitutional: sleeping but easily arouses, no distress   Abdomen: Abdomen obese, soft, non tender             Data:   Data   Recent Labs   Lab 08/22/20  0731 08/21/20  0644 08/20/20  1056   WBC 11.2* 12.2* 17.0*   HGB 14.3 14.7 16.7*   * 99 97    150 192    139 134   POTASSIUM 4.4 4.1 4.4   CHLORIDE 105 108 103   CO2 24 28 27   BUN 11 12 13   CR 0.79 0.80 0.92   ANIONGAP 8 3 4   JACKIE 8.4* 8.1* 9.0   GLC 88 85 108*   ALBUMIN 2.8*  --   --    PROTTOTAL 7.2  --   --    BILITOTAL 0.6  --   --    ALKPHOS 115  --   --    ALT 45  --   --    AST 45  --   --         Shalini Dania Ronquillosted, PA-C      Seen and agree. Denies abd pain, raymond diet, had BM. Intermittently hypoxic  Ok for discharge from a diverticulitis standpoint   Laura Acuna MD

## 2020-08-23 NOTE — PROGRESS NOTES
End of Shift Summary  For vital signs and complete assessments, please see documentation flowsheets.     Pertinent assessments: A&O, slightly elevated BP, currently on 3L with oxy mask. C/O HA 4/10, declined pain medications. Ambulated to bathroom, CARRION. Removed PIV in right hand, unable to flush, leaking.     Major Shift Events: none   Treatment Plan: Pain management, IV Zosyn, IV Protonix

## 2020-08-23 NOTE — PROGRESS NOTES
"BP (!) 143/88 (BP Location: Right arm)   Pulse 77   Temp 97.4  F (36.3  C) (Oral)   Resp 18   Ht 1.626 m (5' 4\")   Wt 114.2 kg (251 lb 12.8 oz)   SpO2 90%   BMI 43.22 kg/m    Patient alert and oriented. No reports of confusion or hallucinations this shift. Complains of headache, pt believes this maybe due to no caffeine since admission. Refused the need for medications at this time. Denies change in abdominal pain. Up in room with SBA on 3L of O2. Will continue to monitor and provide supportive care.  "

## 2020-08-26 LAB
BACTERIA SPEC CULT: NO GROWTH
BACTERIA SPEC CULT: NO GROWTH
SPECIMEN SOURCE: NORMAL
SPECIMEN SOURCE: NORMAL

## 2021-05-26 ENCOUNTER — RECORDS - HEALTHEAST (OUTPATIENT)
Dept: ADMINISTRATIVE | Facility: CLINIC | Age: 51
End: 2021-05-26

## 2022-10-03 NOTE — ED TRIAGE NOTES
Patient presents with complaints of lower abdomen pain for the past four days. Denies any vomiting or diarrhea. ABC intact without need for intervention at this time.      Topical Clindamycin Counseling: Patient counseled that this medication may cause skin irritation or allergic reactions.  In the event of skin irritation, the patient was advised to reduce the amount of the drug applied or use it less frequently.   The patient verbalized understanding of the proper use and possible adverse effects of clindamycin.  All of the patient's questions and concerns were addressed.